# Patient Record
Sex: FEMALE | Race: WHITE | NOT HISPANIC OR LATINO | URBAN - METROPOLITAN AREA
[De-identification: names, ages, dates, MRNs, and addresses within clinical notes are randomized per-mention and may not be internally consistent; named-entity substitution may affect disease eponyms.]

---

## 2017-01-23 ENCOUNTER — APPOINTMENT (RX ONLY)
Dept: URBAN - METROPOLITAN AREA SURGERY 2 | Facility: SURGERY | Age: 67
Setting detail: DERMATOLOGY
End: 2017-01-23

## 2017-01-23 DIAGNOSIS — H61.11 ACQUIRED DEFORMITY OF PINNA: ICD-10-CM

## 2017-01-23 DIAGNOSIS — Z428 OTHER AFTERCARE INVOLVING THE USE OF PLASTIC SURGERY: ICD-10-CM

## 2017-01-23 PROBLEM — H61.119 ACQUIRED DEFORMITY OF PINNA, UNSPECIFIED EAR: Status: ACTIVE | Noted: 2017-01-23

## 2017-01-23 PROCEDURE — ? PATIENT SPECIFIC COUNSELING

## 2017-01-23 PROCEDURE — ? CONSULTATION - STRETCHED EARLOBE

## 2017-01-23 PROCEDURE — ? FILLERS

## 2017-01-23 NOTE — PROCEDURE: PATIENT SPECIFIC COUNSELING
Other (Free Text): Patient happy with results, wants more volume in the cheeks.
Detail Level: Simple
Other (Free Text): Patient advised that Dr. Nieto recommends no surgery, but possible  the area will help what she doesn't like about her earlobes.\\n\\nPatient stated that she will wait until her next visit for this filler.

## 2017-01-23 NOTE — PROCEDURE: FILLERS
Lateral Face Filler Volume In Cc: 0
Consent: Written consent obtained. Risks include but not limited to bruising, beading, irregular texture, ulceration, infection, allergic reaction, scar formation, incomplete augmentation, temporary nature, procedural pain.
Map Statment: See attached map for complete details
Additional Area 1 Location: Nasal bridge
Topical Anesthesia?: no
Expiration Date (Month Year): 1/27/2018
Lot #: [id number]
Anesthesia Type: 1% lidocaine with epinephrine
Detail Level: Detailed
Filler: Juvederm Voluma XC
Price $ (Numeric Only, No Text Please.): 950
Cheeks Filler Volume In Cc: 0.5

## 2017-03-01 ENCOUNTER — APPOINTMENT (RX ONLY)
Dept: URBAN - METROPOLITAN AREA SURGERY 2 | Facility: SURGERY | Age: 67
Setting detail: DERMATOLOGY
End: 2017-03-01

## 2017-03-01 DIAGNOSIS — Z41.1 ENCOUNTER FOR COSMETIC SURGERY: ICD-10-CM

## 2017-03-01 PROCEDURE — ? PATIENT SPECIFIC COUNSELING

## 2017-03-01 PROCEDURE — ? FILLERS

## 2017-03-01 NOTE — PROCEDURE: PATIENT SPECIFIC COUNSELING
Detail Level: Simple
Other (Free Text): Explained to the patient that I need to build up around her cheek bone. \\nDiscussed treating her lips with Volbella after her oral surgery.

## 2017-03-01 NOTE — PROCEDURE: FILLERS
Topical Anesthesia?: yes
Consent: Written consent obtained. Risks include but not limited to bruising, beading, irregular texture, ulceration, infection, allergic reaction, scar formation, incomplete augmentation, temporary nature, procedural pain.
Dorsal Hands Filler Volume In Cc: 0
Use Map Statement For Sites (Optional): No
Cheeks Filler Volume In Cc: 0.5
Detail Level: Detailed
Administered By (Optional): Emilia
Map Statment: See attached map for complete details
Expiration Date (Month Year): 1/18
Filler: Juvederm Voluma XC
Lot #: Residual filler

## 2017-03-02 ENCOUNTER — APPOINTMENT (RX ONLY)
Dept: URBAN - METROPOLITAN AREA OTHER 9 | Facility: OTHER | Age: 67
Setting detail: DERMATOLOGY
End: 2017-03-02

## 2017-03-02 DIAGNOSIS — L65.8 OTHER SPECIFIED NONSCARRING HAIR LOSS: ICD-10-CM

## 2017-03-02 PROCEDURE — ? HAIR TRANSPLANTATION

## 2017-03-02 ASSESSMENT — LOCATION SIMPLE DESCRIPTION DERM
LOCATION SIMPLE: RIGHT EYEBROW
LOCATION SIMPLE: LEFT EYEBROW

## 2017-03-02 ASSESSMENT — LOCATION DETAILED DESCRIPTION DERM
LOCATION DETAILED: RIGHT CENTRAL EYEBROW
LOCATION DETAILED: LEFT CENTRAL EYEBROW

## 2017-03-02 ASSESSMENT — LOCATION ZONE DERM: LOCATION ZONE: FACE

## 2017-03-02 NOTE — PROCEDURE: HAIR TRANSPLANTATION
Epidermal Closure: running horizontal mattress
Donor Site Anesthesia Type: 0.5% lidocaine with 1:400,000 epinephrine
Post-Op: Following the procedure the donor and recipient sites were cleaned and ointment was applied. Postcare instructions were reviewed in detail with the paitent and they were given a copy to take home. All of the patient's questions were answered prior to leaving.
Detail Level: Simple
Transplantation Type: Strip Method
Estimated Blood Loss (Cc): minimal
Hemostasis: None
Follicle [place]- Strip Method: After the donor site was prepped and anesthetized a thin strip of skin, with intact viable follicles, was obtained. Individual follicles were then carefully removed and prepared for transplant.
Donor Location: posterior scalp
Size Of Graft (Optional): Follicular Units
Epidermal Sutures- Donor Site: 4-0 Ethilon
Recipient Site Preparation And Placement: Numerous individual transplantation sites were created within the recipient site. The previously harvested follicles were then placed in these transplantation sites.
Recipient Site Anesthesia Method: local infiltration
Recipient Site Anesthesia Volume In Cc: 15
Deep Sutures- Donor Site: 3-0 Vicryl
Post-Care Instructions: 1. Alcoholic beverages, ibuprofen and aspirin are not to be taken for 48 hours after surgery.  We will provide you with a prescription for pain medications.  2. On limited occasions, minor bleeding may occur from the transplant site or donor area. This can be controlled by applying gentle but firm pressure on the area for not less than 5-10 minutes with a dampened gauze pad which we will provide for you.  PRESS ONLY, DO NOT RUB.  3.  Sleep in an upright position the first night after surgery, either in a recliner or prop up your head with two pillows. This will help prevent swelling of the scalp and forhead. Try not to sleep on your face.  4.  To prevent swelling of the forehead or around the eyes, apply an ice pack (pack of frozen peas will work well) over the eyebrows for 5-10 minutes every hour.  DO NOT APPLY ICE TO THE GRAFTS. If swelling occurs it will appear on the second or third day after surgery. The swelling is part of the normal healing process, and will usually disappear with 48 hours. Begin using ice packs immediately after surgery and continue for the next 72 hours. Do not use hot compresses, this will intensify swelling.  5. Scabbing will form over the transplant sites and solidify the first few hours after the procedure. These scabs will naturally fall off within 2 weeks.  DO NOT PICK OR SCRATCH AT THESE SCABS. Doing so will endanger the healing of the graft and could cause scarring or increase the chance of infection. Don't be alarmed if there are short hairs in the scabs when they fall off. The hair bulb is inside the scalp. It is natrual to loose the hair shaft.  6. Do not wash your head for 24 hours after the surgery. After 24 hours, lightly wet the hair in the shower, spread shampoo in your hands and gently pat the shampoo on, DO NOT RUB. Rinse lightly in the shower and do not let the full strength of the flow hit your head, it could dislodge some of the grafts for the first 5 days. It is recommended to wash your head daily for the first 2 weeks.  7. For the first week be careful of what kind of strenous activity you perform. Weight lifting and intense workouts should not be done for 7 days. Most activitites in the work place should be easily resumed a day or two after the procedure. Please ask us if you have any questions or concerns.  8. BE SURE TO TAKE ALL MEDICATIONS PROVIDED FOR YOU AS DIRECTED.  9. With the FUE procedure there are no sutures to remove and it is normal for small holes where the donor hair was removed to bleed some the first night.  10. DO NOTE USE PEROXIDE on your scalp or other ointments unless instructed by the physician. This could affect the healing or success of the procedure.  11. Avoid direct sunlight on the head (a loose fitting hat can be worn initially while the recipient and donor sites heal).
Width Of Donor Strip In Cm If Applicable(Optional): 0.7
Suture Removal: 7 days
Surgeon: PRATEEK Lombardo
Price (Use Numbers Only, No Special Characters Or $): 1000
Quality Of Donor Hair: adequate
Consent: Written consent obtained, risks reviewed including but not limited to pain, scarring, infection and incomplete improvement of alopecia.  Patient understands the procedure is cosmetic in nature and will require out of pocket payment.
Indication: Eyebrow Hypotrichosis
Surgical Preparation: After consent was obtained, the donor site and recipient site were cleaned and prepped in the usual fashion.  Anesthesia was obtained as outlined above. The appropriate hair within and immediately surrounding the donor site was trimmed to the desired length.

## 2017-03-09 ENCOUNTER — APPOINTMENT (RX ONLY)
Dept: URBAN - METROPOLITAN AREA OTHER 9 | Facility: OTHER | Age: 67
Setting detail: DERMATOLOGY
End: 2017-03-09

## 2017-03-09 DIAGNOSIS — Z48.02 ENCOUNTER FOR REMOVAL OF SUTURES: ICD-10-CM

## 2017-03-09 PROCEDURE — 99024 POSTOP FOLLOW-UP VISIT: CPT

## 2017-03-09 PROCEDURE — ? SUTURE REMOVAL (GLOBAL PERIOD)

## 2017-03-09 ASSESSMENT — LOCATION DETAILED DESCRIPTION DERM: LOCATION DETAILED: RIGHT CENTRAL FRONTAL SCALP

## 2017-03-09 ASSESSMENT — LOCATION ZONE DERM: LOCATION ZONE: SCALP

## 2017-03-09 ASSESSMENT — LOCATION SIMPLE DESCRIPTION DERM: LOCATION SIMPLE: SCALP

## 2017-03-09 NOTE — PROCEDURE: SUTURE REMOVAL (GLOBAL PERIOD)
Detail Level: Detailed
Body Location Override (Optional - Billing Will Still Be Based On Selected Body Map Location If Applicable): Right parietal scalp
Add 70375 Cpt? (Important Note: In 2017 The Use Of 46902 Is Being Tracked By Cms To Determine Future Global Period Reimbursement For Global Periods): yes

## 2017-03-16 ENCOUNTER — APPOINTMENT (RX ONLY)
Dept: URBAN - METROPOLITAN AREA OTHER 9 | Facility: OTHER | Age: 67
Setting detail: DERMATOLOGY
End: 2017-03-16

## 2017-03-16 DIAGNOSIS — Z48.02 ENCOUNTER FOR REMOVAL OF SUTURES: ICD-10-CM

## 2017-03-16 PROCEDURE — ? SUTURE REMOVAL (GLOBAL PERIOD)

## 2017-03-16 PROCEDURE — 99024 POSTOP FOLLOW-UP VISIT: CPT

## 2017-03-16 ASSESSMENT — LOCATION ZONE DERM: LOCATION ZONE: SCALP

## 2017-03-16 ASSESSMENT — LOCATION SIMPLE DESCRIPTION DERM: LOCATION SIMPLE: SCALP

## 2017-03-16 ASSESSMENT — LOCATION DETAILED DESCRIPTION DERM: LOCATION DETAILED: RIGHT CENTRAL FRONTAL SCALP

## 2017-03-16 NOTE — PROCEDURE: SUTURE REMOVAL (GLOBAL PERIOD)
Detail Level: Detailed
Add 55174 Cpt? (Important Note: In 2017 The Use Of 51315 Is Being Tracked By Cms To Determine Future Global Period Reimbursement For Global Periods): yes

## 2017-04-19 ENCOUNTER — APPOINTMENT (RX ONLY)
Dept: URBAN - METROPOLITAN AREA OTHER 9 | Facility: OTHER | Age: 67
Setting detail: DERMATOLOGY
End: 2017-04-19

## 2017-04-19 ENCOUNTER — APPOINTMENT (RX ONLY)
Dept: URBAN - METROPOLITAN AREA SURGERY 2 | Facility: SURGERY | Age: 67
Setting detail: DERMATOLOGY
End: 2017-04-19

## 2017-04-19 DIAGNOSIS — Z41.1 ENCOUNTER FOR COSMETIC SURGERY: ICD-10-CM

## 2017-04-19 DIAGNOSIS — L65.8 OTHER SPECIFIED NONSCARRING HAIR LOSS: ICD-10-CM

## 2017-04-19 DIAGNOSIS — L72.0 EPIDERMAL CYST: ICD-10-CM

## 2017-04-19 PROCEDURE — ? FILLERS

## 2017-04-19 PROCEDURE — ? OTHER

## 2017-04-19 PROCEDURE — 99024 POSTOP FOLLOW-UP VISIT: CPT

## 2017-04-19 PROCEDURE — ? COUNSELING

## 2017-04-19 ASSESSMENT — LOCATION DETAILED DESCRIPTION DERM
LOCATION DETAILED: LEFT MEDIAL EYEBROW
LOCATION DETAILED: RIGHT MEDIAL EYEBROW

## 2017-04-19 ASSESSMENT — LOCATION ZONE DERM: LOCATION ZONE: FACE

## 2017-04-19 ASSESSMENT — LOCATION SIMPLE DESCRIPTION DERM
LOCATION SIMPLE: RIGHT EYEBROW
LOCATION SIMPLE: LEFT EYEBROW

## 2017-04-19 NOTE — PROCEDURE: OTHER
Note Text (......Xxx Chief Complaint.): This diagnosis correlates with the
Other (Free Text): Follow up on hair transplant surgery. Pt is healing well
Detail Level: Zone

## 2017-04-19 NOTE — PROCEDURE: FILLERS
Decollete Filler Volume In Cc: 0
[place] Filler Volume In Cc: 0.2
Use Map Statement For Sites (Optional): No
Additional Area 3 Location: Right upper eyelid
Administered By (Optional): Emilia
Filler: Juvederm Voluma XC
Expiration Date (Month Year): 4/18
Map Statment: See attached map for complete details
Additional Area 1 Location: Right cheek
Additional Area 4 Location: Smile lines
Topical Anesthesia?: yes
Lot #: [id number]
Additional Area 2 Location: Nasal dorsum
Cheeks Filler Volume In Cc: 0.8
Detail Level: Simple
Consent: Written consent obtained. Risks include but not limited to bruising, beading, irregular texture, ulceration, infection, allergic reaction, scar formation, incomplete augmentation, temporary nature, procedural pain.

## 2017-05-09 ENCOUNTER — APPOINTMENT (RX ONLY)
Dept: URBAN - METROPOLITAN AREA SURGERY 2 | Facility: SURGERY | Age: 67
Setting detail: DERMATOLOGY
End: 2017-05-09

## 2017-05-09 DIAGNOSIS — Z41.1 ENCOUNTER FOR COSMETIC SURGERY: ICD-10-CM

## 2017-05-09 PROCEDURE — ? PATIENT SPECIFIC COUNSELING

## 2017-05-09 PROCEDURE — ? CONSULTATION - AGING FACE

## 2017-05-09 NOTE — PROCEDURE: PATIENT SPECIFIC COUNSELING
Other (Free Text): Patients Voluma in left cheek was manually manipulated by Dr. Nieto to create more symmetry. Patient agrees that it looks better, will follow up in 1 week.
Detail Level: Simple

## 2017-05-22 ENCOUNTER — APPOINTMENT (RX ONLY)
Dept: URBAN - METROPOLITAN AREA SURGERY 2 | Facility: SURGERY | Age: 67
Setting detail: DERMATOLOGY
End: 2017-05-22

## 2017-05-22 DIAGNOSIS — Z41.1 ENCOUNTER FOR COSMETIC SURGERY: ICD-10-CM

## 2017-05-22 PROCEDURE — ? PATIENT SPECIFIC COUNSELING

## 2017-06-03 ENCOUNTER — APPOINTMENT (RX ONLY)
Dept: URBAN - METROPOLITAN AREA OTHER 9 | Facility: OTHER | Age: 67
Setting detail: DERMATOLOGY
End: 2017-06-03

## 2017-06-03 DIAGNOSIS — L65.9 NONSCARRING HAIR LOSS, UNSPECIFIED: ICD-10-CM

## 2017-06-03 DIAGNOSIS — L738 OTHER SPECIFIED DISEASES OF HAIR AND HAIR FOLLICLES: ICD-10-CM

## 2017-06-03 DIAGNOSIS — L663 OTHER SPECIFIED DISEASES OF HAIR AND HAIR FOLLICLES: ICD-10-CM

## 2017-06-03 DIAGNOSIS — L73.9 FOLLICULAR DISORDER, UNSPECIFIED: ICD-10-CM

## 2017-06-03 PROBLEM — L02.02 FURUNCLE OF FACE: Status: ACTIVE | Noted: 2017-06-03

## 2017-06-03 PROCEDURE — ? OTHER

## 2017-06-03 PROCEDURE — ? PRESCRIPTION

## 2017-06-03 PROCEDURE — 99213 OFFICE O/P EST LOW 20 MIN: CPT

## 2017-06-03 PROCEDURE — ? COUNSELING

## 2017-06-03 RX ORDER — MINOXIDIL 10 MG/1
TABLET ORAL
Qty: 90 | Refills: 3 | Status: ERX

## 2017-06-03 ASSESSMENT — LOCATION SIMPLE DESCRIPTION DERM
LOCATION SIMPLE: RIGHT SCALP
LOCATION SIMPLE: LEFT EYEBROW
LOCATION SIMPLE: RIGHT EYEBROW

## 2017-06-03 ASSESSMENT — LOCATION ZONE DERM
LOCATION ZONE: FACE
LOCATION ZONE: SCALP

## 2017-06-03 ASSESSMENT — LOCATION DETAILED DESCRIPTION DERM
LOCATION DETAILED: RIGHT LATERAL EYEBROW
LOCATION DETAILED: RIGHT CENTRAL EYEBROW
LOCATION DETAILED: LEFT LATERAL EYEBROW
LOCATION DETAILED: RIGHT MEDIAL FRONTAL SCALP
LOCATION DETAILED: LEFT MEDIAL EYEBROW
LOCATION DETAILED: LEFT CENTRAL EYEBROW

## 2017-06-03 NOTE — HPI: BUMPS
How Severe Are Your Bumps?: mild
Have Your Bumps Been Treated?: not been treated
Is This A New Presentation, Or A Follow-Up?: Bumps
Additional History: Patient states that bumps are coming and going from restoration site. Patient was advised by Dr. Nieto to RTC for lancing. Patient is also requesting prescription refill for minoxidil 10mg 1 tablet QD. Previous PCP, Dr. Ruiz, was providing prescription but passed.

## 2017-06-03 NOTE — PROCEDURE: OTHER
Note Text (......Xxx Chief Complaint.): This diagnosis correlates with the
Other (Free Text): Treated with 16G needle today
Detail Level: Zone

## 2017-06-19 ENCOUNTER — APPOINTMENT (RX ONLY)
Dept: URBAN - METROPOLITAN AREA SURGERY 2 | Facility: SURGERY | Age: 67
Setting detail: DERMATOLOGY
End: 2017-06-19

## 2017-06-19 DIAGNOSIS — Z41.1 ENCOUNTER FOR COSMETIC SURGERY: ICD-10-CM

## 2017-06-19 PROCEDURE — ? FILLERS (CC)

## 2017-06-19 PROCEDURE — ? COSMETIC CONSULTATION - ULTHERAPY

## 2017-06-19 ASSESSMENT — LOCATION DETAILED DESCRIPTION DERM
LOCATION DETAILED: RIGHT LATERAL BUCCAL CHEEK
LOCATION DETAILED: RIGHT INFERIOR CENTRAL MALAR CHEEK
LOCATION DETAILED: LEFT INFERIOR LATERAL MALAR CHEEK
LOCATION DETAILED: SUBMENTAL CHIN

## 2017-06-19 ASSESSMENT — LOCATION SIMPLE DESCRIPTION DERM
LOCATION SIMPLE: SUBMENTAL CHIN
LOCATION SIMPLE: LEFT CHEEK
LOCATION SIMPLE: RIGHT CHEEK

## 2017-06-19 ASSESSMENT — LOCATION ZONE DERM: LOCATION ZONE: FACE

## 2017-06-19 NOTE — PROCEDURE: FILLERS (CC)
Anesthesia Volume In Cc: 0
Additional Area 2 Volume In Cc: 0.5
Expiration Date (Month Year): September 2018
Additional Area 2 Location: left submalar region
Postcare: The patient was instructed to apply ice for 30 minutes and avoid exertion for 24 hours.
Quantity Per Injection Site (Cc): 0.5 cc
Topical Anesthesia: topical 4% lidocaine
Consent: Informed consent was obtained with the patient verbalizing full understanding. The risks, benefits, expectations and alternatives of the procedure were explained in detail to the patient, including, but not limited to, bruising, beading, irregular texture, ulceration, infection, allergic reaction, scar formation, incomplete augmentation, temporary nature, procedural pain.
Lot #: [id number]
Detail Level: Generalized
Price $ (Use Numbers Only, No Text Please.): 867
Filler: Restylane
Additional Area 1 Location: right submalar region

## 2017-06-19 NOTE — PROCEDURE: COSMETIC CONSULTATION - ULTHERAPY
Detail Level: Detailed
Procedure Quote $ (Will Render In Note. Use Numbers Only, No Text Please.): 8731
Send Procedure Quote As Charge: No

## 2017-08-31 ENCOUNTER — APPOINTMENT (RX ONLY)
Dept: URBAN - METROPOLITAN AREA OTHER 9 | Facility: OTHER | Age: 67
Setting detail: DERMATOLOGY
End: 2017-08-31

## 2017-08-31 DIAGNOSIS — L82.1 OTHER SEBORRHEIC KERATOSIS: ICD-10-CM

## 2017-08-31 DIAGNOSIS — L72.8 OTHER FOLLICULAR CYSTS OF THE SKIN AND SUBCUTANEOUS TISSUE: ICD-10-CM

## 2017-08-31 PROCEDURE — ? PATIENT SPECIFIC COUNSELING

## 2017-08-31 PROCEDURE — ? INTRALESIONAL KENALOG

## 2017-08-31 PROCEDURE — 11900 INJECT SKIN LESIONS </W 7: CPT

## 2017-08-31 PROCEDURE — ? COUNSELING

## 2017-08-31 PROCEDURE — ? COSMETIC QUOTE

## 2017-08-31 ASSESSMENT — LOCATION SIMPLE DESCRIPTION DERM
LOCATION SIMPLE: LEFT EYEBROW
LOCATION SIMPLE: UPPER BACK

## 2017-08-31 ASSESSMENT — LOCATION DETAILED DESCRIPTION DERM
LOCATION DETAILED: INFERIOR THORACIC SPINE
LOCATION DETAILED: LEFT MEDIAL EYEBROW

## 2017-08-31 ASSESSMENT — LOCATION ZONE DERM
LOCATION ZONE: TRUNK
LOCATION ZONE: FACE

## 2017-08-31 NOTE — PROCEDURE: INTRALESIONAL KENALOG
Medical Necessity Clause: This procedure was medically necessary because the lesions that were treated were:
Detail Level: Simple
Administered By (Optional): 
Concentration Of Kenalog Solution Injected (Mg/Ml): 1.0
X Size Of Lesion In Cm (Optional): 0
Treatment Number (Optional): 1
Consent: The risks of atrophy were reviewed with the patient.
Total Volume (Ccs): .1
Include Z78.9 (Other Specified Conditions Influencing Health Status) As An Associated Diagnosis?: No
Kenalog Preparation: Kenalog
Lot # For Kenalog (Optional): [id number]
Expiration Date For Kenalog (Optional): Aug 2018

## 2017-08-31 NOTE — PROCEDURE: COSMETIC QUOTE
Notice: We have created a more complete Cosmetic Quote plan.  The procedure name is also Cosmetic Quote.  Please review the new plan and hide the Cosmetic Quote plan you do not want to use.
Discount Percentage: 0
Botox Price Per Unit: 15
Detail Level: Simple
Restylane Price Per Syringe: 300
Misc Procedure Description: Removal of Seborrheic Keratosis

## 2017-12-29 ENCOUNTER — APPOINTMENT (RX ONLY)
Dept: URBAN - METROPOLITAN AREA CLINIC 12 | Facility: CLINIC | Age: 67
Setting detail: DERMATOLOGY
End: 2017-12-29

## 2017-12-29 DIAGNOSIS — L72.0 EPIDERMAL CYST: ICD-10-CM

## 2017-12-29 DIAGNOSIS — D48.5 NEOPLASM OF UNCERTAIN BEHAVIOR OF SKIN: ICD-10-CM

## 2017-12-29 PROCEDURE — 11442 EXC FACE-MM B9+MARG 1.1-2 CM: CPT

## 2017-12-29 PROCEDURE — ? COUNSELING - INCLUSION CYST

## 2017-12-29 PROCEDURE — 11100: CPT | Mod: 59

## 2017-12-29 PROCEDURE — ? BIOPSY BY SHAVE METHOD

## 2017-12-29 PROCEDURE — ? EXCISION, CUTANEOUS (BENIGN)

## 2017-12-29 PROCEDURE — 13131 CMPLX RPR F/C/C/M/N/AX/G/H/F: CPT

## 2017-12-29 PROCEDURE — 11101: CPT

## 2017-12-29 ASSESSMENT — LOCATION DETAILED DESCRIPTION DERM
LOCATION DETAILED: RIGHT LATERAL MANDIBULAR CHEEK
LOCATION DETAILED: LEFT MEDIAL SUPERIOR CHEST
LOCATION DETAILED: RIGHT INFERIOR PREAURICULAR CHEEK
LOCATION DETAILED: RIGHT MEDIAL BREAST 2-3:00 REGION
LOCATION DETAILED: LOWER STERNUM
LOCATION DETAILED: LEFT MEDIAL BREAST 10-11:00 REGION

## 2017-12-29 ASSESSMENT — LOCATION SIMPLE DESCRIPTION DERM
LOCATION SIMPLE: LEFT BREAST
LOCATION SIMPLE: RIGHT CHEEK
LOCATION SIMPLE: CHEST
LOCATION SIMPLE: RIGHT BREAST

## 2017-12-29 ASSESSMENT — LOCATION ZONE DERM
LOCATION ZONE: TRUNK
LOCATION ZONE: TRUNK
LOCATION ZONE: FACE

## 2017-12-29 NOTE — PROCEDURE: BIOPSY BY SHAVE METHOD
Surgeon: Dr. Nieto
Anesthesia Type: 1% lidocaine with epinephrine
Anesthesia Volume In Cc: 2
Estimated Blood Loss (Cc): scant
Consent: Written consent was obtained and risks, benefits, expectations and alternatives were reviewed including, but not limited to, scarring, infection, bleeding, scabbing, incomplete removal, nerve damage and allergy to anesthesia.
Notification Instructions: Patient will be notified of biopsy results. However, the patient was instructed to call the office if not contacted within 2 weeks.
Detail Level: Detailed
Bill For Surgical Tray: no
Biopsy Method: Dermablade
Biopsy Type: H and E
Hemostasis: Aluminum Chloride
Size Of Lesion In Cm (Optional): 0.2

## 2017-12-29 NOTE — PROCEDURE: EXCISION, CUTANEOUS (BENIGN)
Cartilage Graft: The defect edges were debeveled with a #15 scalpel blade.  Given the location of the defect, shape of the defect, the fact the defect involved a full thickness cartilage defect a cartilage graft was deemed most appropriate.  An appropriate donor site was identified, cleansed, and anesthetized. The cartilage graft was then harvested and transferred to the recipient site, oriented appropriately and then sutured into place.  The secondary defect was then repaired using a primary closure.
Detail Level: Detailed
Ebl Minimal?: yes
Consent was obtained from the patient. The risks and benefits to therapy were discussed in detail. Specifically, the risks of infection, scarring, bleeding, prolonged wound healing, incomplete removal, allergy to anesthesia, nerve injury and recurrence were addressed. Prior to the procedure, the treatment site was clearly identified and confirmed by the patient. All components of Universal Protocol/PAUSE Rule completed.
Split Thickness Skin Graft: The defect edges were debeveled with a #15 scalpel blade.  Given the location of the defect, shape of the defect and the proximity to free margins, a split thickness skin graft was deemed most appropriate.  Using a sterile surgical marker, the primary defect shape was transferred to the donor site. The split thickness graft was then harvested.  The skin graft was then placed in the primary defect and oriented appropriately.
Medical Necessity Clause: This procedure was medically necessary because the lesion that was treated was:
O-T Advancement Flap: The defect edges were debeveled with a #15 scalpel blade.  Given the location of the defect, shape of the defect and the proximity to free margins an O-T advancement flap was deemed most appropriate.  Using a sterile surgical marker, an appropriate advancement flap was drawn incorporating the defect and placing the expected incisions within the relaxed skin tension lines where possible.    The area thus outlined was incised deep to adipose tissue with a #15 scalpel blade.  The skin margins were undermined to an appropriate distance in all directions utilizing iris scissors.
Cheek Advancement Flap: The defect edges were debeveled with a #15 scalpel blade.  Given the location of the defect, shape of the defect and the proximity to free margins a cheek advancement flap was deemed most appropriate.  Using a sterile surgical marker, an appropriate advancement flap was drawn incorporating the defect and placing the expected incisions within the relaxed skin tension lines where possible. The incision was carried down the alar-facial groove and nasolabial fold and up the nasal sidewall as necessary to release the tension and allow advancement. The area thus outlined was incised deep to adipose tissue with a #15 scalpel blade.  The skin margins were undermined to an appropriate distance in all directions utilizing iris scissors.
M-Plasty: The lesion was extirpated to the level of the fat with a #15 scalpel blade.  Given the location of the defect, shape of the defect and the proximity to free margins, an M-plasty was deemed most appropriate for repair to reduce the amount of healthy tissue removed.  The appropriate transposition arms of the M-plasty were drawn placing the expected incisions within the relaxed skin tension lines where possible. The area thus outlined was incised deep to adipose tissue with a #15 scalpel blade. The skin margins were undermined to an appropriate distance in all directions utilizing iris scissors. The limbs of the M were then closed.
Linear Repair Length, In Cm: 1.5
Suture Removal: 7 days
Double-Opposing Z-Plasty: The defect edges were debeveled with a #15 scalpel blade.  Given the location of the defect, shape of the defect and the proximity to free margins, a double-opposing Z-plasty (double transposition flap) was deemed most appropriate.  Using a sterile surgical marker, the appropriate transposition flaps were drawn incorporating the defect and placing the expected incisions within the relaxed skin tension lines where possible.  The area thus outlined was incised deep to adipose tissue with a #15 scalpel blade.  The skin margins were undermined to an appropriate distance in all directions utilizing iris scissors.  Hemostasis was achieved with electrocautery.  The flaps were then transposed into place, one clockwise and the other counterclockwise, and anchored with interrupted buried subcutaneous sutures.
Melolabial Flap: The defect edges were debeveled with a #15 scalpel blade.  Given the location of the defect and the proximity to free margins a melolabial flap was deemed most appropriate.  Using a sterile surgical marker, an appropriate melolabial flap was drawn incorporating the defect.    The area thus outlined was incised deep to adipose tissue with a #15 scalpel blade.  The skin margins were undermined to an appropriate distance in all directions utilizing iris scissors.
Transposition Flap: The defect edges were debeveled with a #15 scalpel blade.  Given the location of the defect and the proximity to free margins a transposition flap was deemed most appropriate.  Using a sterile surgical marker, an appropriate transposition flap was drawn incorporating the defect.    The area thus outlined was incised deep to adipose tissue with a #15 scalpel blade.  The skin margins were undermined to an appropriate distance in all directions utilizing iris scissors.
Helical Advancement Flap: The defect edges were debeveled with a #15 blade scalpel.  Given the location of the defect and the proximity to free margins (helical rim) a double helical rim advancement flap was deemed most appropriate.  Using a sterile surgical marker, the appropriate advancement flaps were drawn incorporating the defect and placing the expected incisions between the helical rim and antihelix where possible.  The area thus outlined was incised through and through with a #15 scalpel blade.  With a skin hook and iris scissors, the flaps were gently and sharply undermined and freed up.
Complex Repair: Complex closure of the defect was planned to allow for a tension free repair.
Island Pedicle Flap- Requiring A Named Axial Vessel: The defect edges were debeveled with a #15 scalpel blade.  Given the location of the defect, shape of the defect and the proximity to free margins an island pedicle advancement flap was deemed most appropriate.  Using a sterile surgical marker, an appropriate advancement flap was drawn, based on the axial vessel mentioned above, incorporating the defect, outlining the appropriate donor tissue and placing the expected incisions within the relaxed skin tension lines where possible.    The area thus outlined was incised deep to adipose tissue with a #15 scalpel blade.  The skin margins were undermined to an appropriate distance in all directions around the primary defect and laterally outward around the island pedicle utilizing iris scissors.  There was minimal undermining beneath the pedicle flap.
Path Notes (To The Pathologist): Please check margins.
Dorsal Nasal Flap: The defect edges were debeveled with a #15 scalpel blade.  Given the location of the defect and the proximity to free margins a dorsal nasal flap was deemed most appropriate.  Using a sterile surgical marker, an appropriate dorsal nasal flap was drawn around the defect.    The area thus outlined was incised deep to adipose tissue with a #15 scalpel blade.  The skin margins were undermined to an appropriate distance in all directions utilizing iris scissors.
Dermal Sutures: 0
Z-Plasty: The lesion was extirpated to the level of the fat with a #15 scalpel blade.  Given the location of the defect, shape of the defect and the proximity to free margins, a Z-plasty was deemed most appropriate for repair.  Using a sterile surgical marker, the appropriate transposition arms of the Z-plasty were drawn incorporating the defect and placing the expected incisions within the relaxed skin tension lines where possible.  The area thus outlined was incised deep to adipose tissue with a #15 scalpel blade.  The skin margins were undermined to an appropriate distance in all directions utilizing iris scissors.  The opposing transposition arms were then transposed into place in opposite direction and inset.
Advancement Flap (Single): The defect edges were debeveled with a #15 scalpel blade.  Given the location of the defect and the proximity to free margins, a single advancement flap was deemed most appropriate.  Using a sterile surgical marker, an appropriate advancement flap was drawn incorporating the defect and placing the expected incisions within the relaxed skin tension lines where possible.  The area thus outlined was incised deep to adipose tissue with a #15 scalpel blade.  The skin margins were undermined to an appropriate distance in all directions utilizing iris scissors.
Repair Type: Complex
Rhombic Flap: The defect edges were debeveled with a #15 scalpel blade.  Given the location of the defect and the proximity to free margins, a rhombic flap was deemed most appropriate.  Using a sterile surgical marker, an appropriate rhombic flap was drawn incorporating the defect.  The area thus outlined was incised deep to adipose tissue with a #15 scalpel blade.  The skin margins were undermined to an appropriate distance in all directions utilizing iris scissors.
Intermediate Layered Repair: Intermediate repair was planned with layered closure after freeing the skin edges for advancement.
Staged Pedicle Interpolation Flap: A staged pedicle interpolation flap was selected to achieve optimal aesthetic result, restore normal anatomic appearance and avoid distortion of normal anatomy and expedite and facilitate wound healing. The patient was prepped and draped in the usual manner. A template of the defect was utilized to design the pedicle flap. The donor area was anesthetized. The margins were incised to the level of the superficial fascia with a #15 blade. Undermining of the flap was performed in the fascial plane. Hemostasis was achieved. The pedicle flap was then placed into the defect and the flap was trimmed to match the shape of the defect. The flap donor site wound edges were the re-approximated utilizing deep buried sutures, and simple interrupted sutures. Division of the pedicle flap will be performed in 3 weeks.
Approach And Technique - Excisional Biopsy: An excisional biopsy was designed encompasing the lesion. After ensuring adequate anesthesia, the lesion was excised with a #15 scalpel blade. The specimen was sent for pathologic evaluation.
Bilobed Flap: The defect edges were debeveled with a #15 scalpel blade.  Given the location of the defect and the proximity to free margins, a bilobe flap was deemed most appropriate.  Using a sterile surgical marker, an appropriate bilobe flap drawn around the defect.  The area thus outlined was incised deep to adipose tissue with a #15 scalpel blade.  The skin margins were undermined to an appropriate distance in all directions utilizing iris scissors.
Cheek Interpolation Flap: A decision was made to reconstruct the defect utilizing an interpolation axial flap and a staged reconstruction.  A telfa template was made of the defect.  This telfa template was then used to outline the Cheek Interpolation flap.  The donor area for the pedicle flap was then injected with anesthesia.  The flap was excised through the skin and subcutaneous tissue down to the layer of the underlying musculature.  The interpolation flap was carefully excised within this deep plane to maintain its blood supply.  The edges of the donor site were undermined.   The donor site was closed in a primary fashion.  The pedicle was then rotated into position and sutured.  Once the tube was sutured into place, adequate blood supply was confirmed with blanching and refill.  The pedicle was then wrapped with xeroform gauze and dressed appropriately with a telfa and gauze bandage to ensure continued blood supply and protect the attached pedicle.
X Size Of Lesion In Cm (Optional): 1
Antia-Buch Procedure: An Antia-Buch procedure with wedge cartilage excision was performed for reconstruction of the ear defect. Incisions were made in the helical sulcus with triangular composite excisions of skin and cartilage, leaving the postauricular skin intact as a vascular base. A triangular full thickness wedge excision was completed and the backcut flaps were rotated around to close the defect. The helical rim was approximated first to precisely align the curvature of the helix. Closure was performed to include cartilage and the skin flaps for good cartilage contact and complete coverage over the approximated cartilage.
Rotation Flap: The defect edges were debeveled with a #15 scalpel blade.  Given the location of the defect, shape of the defect and the proximity to free margins, a rotation flap was deemed most appropriate.  Using a sterile surgical marker, an appropriate rotation flap was drawn incorporating the defect and placing the expected incisions within the relaxed skin tension lines where possible.  The area thus outlined was incised deep to adipose tissue with a #15 scalpel blade.  The skin margins were undermined to an appropriate distance in all directions utilizing iris scissors.
Advancement Flap (Double): The defect edges were debeveled with a #15 scalpel blade.  Given the location of the defect and the proximity to free margins, a double advancement flap was deemed most appropriate.  Using a sterile surgical marker, the appropriate advancement flaps were drawn incorporating the defect and placing the expected incisions within the relaxed skin tension lines where possible.  The area thus outlined was incised deep to adipose tissue with a #15 scalpel blade.  The skin margins were undermined to an appropriate distance in all directions utilizing iris scissors.
Island Pedicle Flap: The defect edges were debeveled with a #15 scalpel blade.  Given the location of the defect, shape of the defect and the proximity to free margins an island pedicle advancement flap was deemed most appropriate.  Using a sterile surgical marker, an appropriate advancement flap was drawn incorporating the defect, outlining the appropriate donor tissue and placing the expected incisions within the relaxed skin tension lines where possible.    The area thus outlined was incised deep to adipose tissue with a #15 scalpel blade.  The skin margins were undermined to an appropriate distance in all directions around the primary defect and laterally outward around the island pedicle utilizing iris scissors.  There was minimal undermining beneath the pedicle flap.
Dermal Autograft: The defect edges were debeveled with a #15 scalpel blade.  Given the location of the defect, shape of the defect and the proximity to free margins, a dermal autograft was deemed most appropriate.  Using a sterile surgical marker, the primary defect shape was transferred to the donor site. The area thus outlined was incised deep to adipose tissue with a #15 scalpel blade.  The harvested graft was then trimmed of adipose and epidermal tissue until only dermis was left.  The skin graft was then placed in the primary defect and oriented appropriately.
Ear Star Wedge Flap: An ear wedge excision with lateral wedge extensions was identified as the best reconstuctive method of the defect. These were designed to incorporate the defect. The full thickness wedge was excised as were two crescentic extensions to allow for a small diameter reduction to the ear and ease of closing and advancing the flaps. The flaps were inset and closure accomplished.
Modified Advancement Flap: The defect edges were debeveled with a #15 scalpel blade.  Given the location of the defect, shape of the defect and the proximity to free margins a modified advancement flap was deemed most appropriate.  Using a sterile surgical marker, an appropriate advancement flap was drawn incorporating the defect and placing the expected incisions within the relaxed skin tension lines where possible.    The area thus outlined was incised deep to adipose tissue with a #15 scalpel blade.  The skin margins were undermined to an appropriate distance in all directions utilizing iris scissors.
Double M-Plasty: The lesion was extirpated to the level of the fat with a #15 scalpel blade.  Given the location of the defect, shape of the defect and the proximity to free margins, a double M-plasty was deemed most appropriate for repair to reduce the amount of healthy tissue removed.  The appropriate transposition arms of the M-plastys were drawn placing the expected incisions within the relaxed skin tension lines where possible. The area thus outlined was incised deep to adipose tissue with a #15 scalpel blade. The skin margins were undermined to an appropriate distance in all directions utilizing iris scissors. The limbs of the M's were then closed.
Nasolabial Flap: The defect edges were debeveled with a #15 scalpel blade.  Given the location of the defect and the proximity to free margins, a nasolabial flap was deemed most appropriate.  Using a sterile surgical marker, an appropriate nasolabial flap was drawn incorporating the defect.  The area thus outlined was incised deep to adipose tissue with a #15 scalpel blade.  The skin margins were undermined to an appropriate distance in all directions utilizing iris scissors.
Size Of Margin In Cm: 0.5
Bill For Surgical Tray: no
Xenograft: The defect edges were debeveled with a #15 scalpel blade.  Given the location of the defect, shape of the defect and the proximity to free margins, a xenograft was deemed most appropriate.  The graft was then trimmed to fit the size of the defect.  The graft was then placed in the primary defect and oriented appropriately.
Epidermal Sutures: 5-0
Notification Instructions: Patient will be notified of results. However, the patient was instructed to call the office if not contacted within 2 weeks.
Cheek-To-Nose Interpolation Flap: A decision was made to reconstruct the defect utilizing an interpolation axial flap and a staged reconstruction.  A telfa template was made of the defect.  This telfa template was then used to outline the Cheek-To-Nose Interpolation flap.  The donor area for the pedicle flap was then injected with anesthesia.  The flap was excised through the skin and subcutaneous tissue down to the layer of the underlying musculature.  The interpolation flap was carefully excised within this deep plane to maintain its blood supply.  The edges of the donor site were undermined.   The donor site was closed in a primary fashion.  The pedicle was then rotated into position and sutured.  Once the tube was sutured into place, adequate blood supply was confirmed with blanching and refill.  The pedicle was then wrapped with xeroform gauze and dressed appropriately with a telfa and gauze bandage to ensure continued blood supply and protect the attached pedicle.
S Plasty: Given the location and shape of the defect, and the orientation of relaxed skin tension lines, an S-plasty was deemed most appropriate for repair.  Using a sterile surgical marker, the appropriate outline of the S-plasty was drawn, incorporating the defect and placing the expected incisions within the relaxed skin tension lines where possible.  The area thus outlined was incised deep to adipose tissue with a #15 scalpel blade.  The skin margins were undermined to an appropriate distance in all directions utilizing iris scissors. The skin flaps were advanced over the defect.  The opposing margins were then approximated with interrupted buried subcutaneous sutures.
Epidermal Autograft: The defect edges were debeveled with a #15 scalpel blade.  Given the location of the defect, shape of the defect and the proximity to free margins an epidermal autograft was deemed most appropriate.  Using a sterile surgical marker, the primary defect shape was transferred to the donor site. The epidermal graft was then harvested.  The skin graft was then placed in the primary defect and oriented appropriately.
V-Y Plasty: The defect edges were debeveled with a #15 scalpel blade.  Given the location of the defect, shape of the defect and the proximity to free margins, a V-Y advancement flap was deemed most appropriate.  Using a sterile surgical marker, an appropriate advancement flap was drawn incorporating the defect and placing the expected incisions within the relaxed skin tension lines where possible.  The area thus outlined was incised deep to adipose tissue with a #15 scalpel blade.  The skin margins were undermined to an appropriate distance in all directions utilizing iris scissors.
Mastoid Interpolation Flap: A decision was made to reconstruct the defect utilizing an interpolation axial flap and a staged reconstruction.  A telfa template was made of the defect.  This telfa template was then used to outline the mastoid interpolation flap.  The donor area for the pedicle flap was then injected with anesthesia.  The flap was excised through the skin and subcutaneous tissue down to the layer of the underlying musculature.  The pedicle flap was carefully excised within this deep plane to maintain its blood supply.  The edges of the donor site were undermined.   The donor site was closed in a primary fashion.  The pedicle was then rotated into position and sutured.  Once the tube was sutured into place, adequate blood supply was confirmed with blanching and refill.  The pedicle was then wrapped with xeroform gauze and dressed appropriately with a telfa and gauze bandage to ensure continued blood supply and protect the attached pedicle.
Melolabial Interpolation Flap: A decision was made to reconstruct the defect utilizing an interpolation axial flap and a staged reconstruction.  A telfa template was made of the defect.  This telfa template was then used to outline the melolabial interpolation flap.  The donor area for the pedicle flap was then injected with anesthesia.  The flap was excised through the skin and subcutaneous tissue down to the layer of the underlying musculature.  The pedicle flap was carefully excised within this deep plane to maintain its blood supply.  The edges of the donor site were undermined.   The donor site was closed in a primary fashion.  The pedicle was then rotated into position and sutured.  Once the tube was sutured into place, adequate blood supply was confirmed with blanching and refill.  The pedicle was then wrapped with xeroform gauze and dressed appropriately with a telfa and gauze bandage to ensure continued blood supply and protect the attached pedicle.
Cultured Epidermal Autograft: The defect edges were debeveled with a #15 scalpel blade.  Given the location of the defect, shape of the defect and the proximity to free margins, a tissue cultured epidermal autograft was deemed most appropriate.  The graft was then trimmed to fit the size of the defect.  The graft was then placed in the primary defect and oriented appropriately.
Pedicled Retroauricular Flap: A decision was made to reconstruct the defect utilizing a pedicled retroauricular flap reconstruction.  The pedicle flap was marked, injected with local anesthesia, and carefully excised within the deep subcutaneous plane to maintain its blood supply.  The edges of the donor site were undermined.   The donor site was closed in a primary fashion.  The pedicle was then rotated into position and sutured.  Once the flap was sutured into place, adequate blood supply was confirmed with blanching and refill.  The pedicle was then dressed appropriately with antibiotic ointment and gauze bandage to ensure continued blood supply.
Composite Graft: The defect edges were debeveled with a #15 scalpel blade.  Given the location of the defect, shape of the defect, the proximity to free margins and the fact the defect was full thickness a composite graft was deemed most appropriate.  The defect was outline and then transferred to the donor site.  A full thickness graft was then excised from the donor site. The graft was then placed in the primary defect, oriented appropriately and then sutured into place.  The secondary defect was then repaired using a primary closure.
Epidermal Sutures: Prolene
Double-Opposing Semicircular Flap: The defect was measured and tangents drawn to establish landmarks for the double-opposing semicircular flaps. After measuring the radius and flap lengths, the flaps were marked, the markings incised and cautery used to undermine and raise the flaps. They were rotated into position to reconstruct the defect and the donor sites were closed linearly.
Approach And Technique - Excision: An excision was designed encompasing the lesion with the margin as stated. After ensuring adequate anesthesia, the design was incised with a #15 scalpel blade. The specimen was sent for pathologic evaluation.
Paramedian Forehead Flap: A decision was made to reconstruct the defect utilizing an interpolation axial flap and a staged reconstruction.  A telfa template was made of the defect.  This telfa template was then used to outline the paramedian forehead pedicle flap.  The donor area for the pedicle flap was then injected with anesthesia.  The flap was excised through the skin and subcutaneous tissue down to the layer of the underlying musculature.  The pedicle flap was carefully excised within this deep plane to maintain its blood supply.  The edges of the donor site were undermined.   The donor site was closed in a primary fashion.  The pedicle was then rotated into position and sutured.  Once the tube was sutured into place, adequate blood supply was confirmed with blanching and refill.  The pedicle was then wrapped with xeroform gauze and dressed appropriately with a telfa and gauze bandage to ensure continued blood supply and protect the attached pedicle.
Full Thickness Skin Graft: The defect edges were debeveled with a #15 scalpel blade.  Given the location of the defect, shape of the defect and the proximity to free margins, a full thickness skin graft was deemed most appropriate.  Using a sterile surgical marker, the primary defect shape was transferred to the donor site. The area thus outlined was incised deep to adipose tissue with a #15 scalpel blade.  The harvested graft was then trimmed of adipose tissue until only dermis and epidermis was left.  The skin margins of the secondary defect were undermined to an appropriate distance in all directions utilizing iris scissors.  The secondary defect was closed with interrupted buried subcutaneous sutures.  The skin edges were then re-apposed with running  sutures.  The skin graft was then placed in the primary defect and oriented appropriately.
Burow's triangles were removed to repair standing cone deformities.  These defects were repaired with the same technique and sutured.
A-T Advancement Flap: The defect edges were debeveled with a #15 scalpel blade.  Given the location of the defect, shape of the defect and the proximity to free margins an A-T advancement flap was deemed most appropriate.  Using a sterile surgical marker, an appropriate advancement flap was drawn incorporating the defect and placing the expected incisions within the relaxed skin tension lines where possible.    The area thus outlined was incised deep to adipose tissue with a #15 scalpel blade.  The skin margins were undermined to an appropriate distance in all directions utilizing iris scissors.
W-Plasty: The lesion was extirpated to the level of the fat with a #15 scalpel blade.  Given the location of the defect, shape of the defect and the proximity to free margins, a W-plasty was deemed most appropriate for repair.  Using a sterile surgical marker, the appropriate transposition arms of the W-plasty were drawn incorporating the defect and placing the expected incisions within the relaxed skin tension lines where possible.  The area thus outlined was incised deep to adipose tissue with a #15 scalpel blade.  The skin margins were undermined to an appropriate distance in all directions utilizing iris scissors.  The opposing transposition arms were then transposed into place in opposite direction and inset.
Excision Method: Excision
Simple repair was planned with closure after freeing the skin edges for advancement.
Surgeon: Dr. Nieto

## 2018-01-05 ENCOUNTER — APPOINTMENT (RX ONLY)
Dept: URBAN - METROPOLITAN AREA CLINIC 12 | Facility: CLINIC | Age: 68
Setting detail: DERMATOLOGY
End: 2018-01-05

## 2018-01-05 DIAGNOSIS — Z48.89 ENCOUNTER FOR OTHER SPECIFIED SURGICAL AFTERCARE: ICD-10-CM

## 2018-01-05 PROCEDURE — ? COUNSELING - POST-OP CHECK

## 2018-01-05 PROCEDURE — ? PATIENT SPECIFIC COUNSELING

## 2018-01-05 PROCEDURE — 99024 POSTOP FOLLOW-UP VISIT: CPT

## 2018-01-05 PROCEDURE — ? POST-OP CHECK

## 2018-01-05 ASSESSMENT — LOCATION SIMPLE DESCRIPTION DERM: LOCATION SIMPLE: NECK

## 2018-01-05 ASSESSMENT — LOCATION DETAILED DESCRIPTION DERM: LOCATION DETAILED: RIGHT CENTRAL LATERAL NECK

## 2018-01-05 ASSESSMENT — LOCATION ZONE DERM: LOCATION ZONE: NECK

## 2018-01-05 NOTE — PROCEDURE: PATIENT SPECIFIC COUNSELING
Other (Free Text): Informed patient that she is healing well.\\nDiscussed with patient that she can tape her neck for a week.\\Joseph discussed with patient to use arnica cream to help with bruising.
Detail Level: Detailed

## 2018-01-05 NOTE — PROCEDURE: POST-OP CHECK
Detail Level: Simple
Add Postop Global No-Charge Code (34976)?: yes
Wound Evaluated By: Dr. Nilesh Nieto

## 2018-02-20 ENCOUNTER — APPOINTMENT (RX ONLY)
Dept: URBAN - METROPOLITAN AREA CLINIC 12 | Facility: CLINIC | Age: 68
Setting detail: DERMATOLOGY
End: 2018-02-20

## 2018-02-20 DIAGNOSIS — Z48.89 ENCOUNTER FOR OTHER SPECIFIED SURGICAL AFTERCARE: ICD-10-CM

## 2018-02-20 DIAGNOSIS — Z41.9 ENCOUNTER FOR PROCEDURE FOR PURPOSES OTHER THAN REMEDYING HEALTH STATE, UNSPECIFIED: ICD-10-CM

## 2018-02-20 PROCEDURE — ? PATIENT SPECIFIC COUNSELING

## 2018-02-20 PROCEDURE — ? FILLERS

## 2018-02-20 PROCEDURE — 99024 POSTOP FOLLOW-UP VISIT: CPT

## 2018-02-20 PROCEDURE — ? COUNSELING - POST-OP CHECK

## 2018-02-20 PROCEDURE — ? POST-OP CHECK

## 2018-02-20 ASSESSMENT — LOCATION DETAILED DESCRIPTION DERM: LOCATION DETAILED: RIGHT CENTRAL LATERAL NECK

## 2018-02-20 ASSESSMENT — LOCATION SIMPLE DESCRIPTION DERM: LOCATION SIMPLE: NECK

## 2018-02-20 ASSESSMENT — LOCATION ZONE DERM: LOCATION ZONE: NECK

## 2018-02-20 NOTE — PROCEDURE: POST-OP CHECK
Wound Evaluated By: Dr. Nilesh Nieto
Add Postop Global No-Charge Code (07729)?: yes
Detail Level: Simple

## 2018-02-20 NOTE — PROCEDURE: FILLERS
Brows Filler Volume In Cc: 0
Filler: Belotero
Lot #: [id number]
Additional Area 4 Location: scar
Tear Troughs Filler Volume In Cc: 0.5
Topical Anesthetic #2: tetracaine
Topical Anesthetic #1: lidocaine
Additional Area 5 Location: oral commissures
Show Price In Note?: yes
Additional Area 2 Location: forehead depressions
Map Statment: See attached map for complete details
Additional Area 3 Location: perioral lines
Administered By (Optional): Theresa Vasquez
Topical Anesthetic Base: ointment
Additional Area 1 Location: glabellar/nasal root lines
Price $ (Numeric Only, No Text Please.): 859
Additional Area 1 Location: forehead depression
Topical % #1: 23
Detail Level: Simple
Expiration Date (Month Year): 10/18
Lot #: [id number]
Consent: Written consent obtained. Risks include but not limited to bruising, beading, irregular texture, ulceration, infection, allergic reaction, scar formation, incomplete augmentation, temporary nature, procedural pain.
Filler: Juvederm Ultra Plus XC
Expiration Date (Month Year): 12/18
Topical % #2: 7
Cheeks Filler Volume In Cc: 0.3

## 2018-02-20 NOTE — PROCEDURE: PATIENT SPECIFIC COUNSELING
Other (Free Text): Informed patient that she is healing well. Discussed with patient that she can start to massage the area with the back of an electric toothbrush. Patient also had concern about the indentations present below her nostrils. Dr. Nieto informed pt these areas can be treated with filler.
Detail Level: Detailed

## 2018-02-20 NOTE — PROCEDURE: REASSURANCE
Information Provided: Reassurance was provided to the patient that the clinical findings are within expected normal limits and do not require specific further evaluation or treatment at this time.
Follow-Up: no
Detail Level: Detailed

## 2018-03-14 RX ORDER — MINOXIDIL 10 MG/1
TABLET ORAL
Qty: 90 | Refills: 3 | Status: ERX

## 2018-04-13 ENCOUNTER — APPOINTMENT (RX ONLY)
Dept: URBAN - METROPOLITAN AREA CLINIC 12 | Facility: CLINIC | Age: 68
Setting detail: DERMATOLOGY
End: 2018-04-13

## 2018-04-13 DIAGNOSIS — Z41.9 ENCOUNTER FOR PROCEDURE FOR PURPOSES OTHER THAN REMEDYING HEALTH STATE, UNSPECIFIED: ICD-10-CM

## 2018-04-13 PROCEDURE — ? FILLERS

## 2018-04-13 NOTE — PROCEDURE: FILLERS
Additional Area 5 Volume In Cc: 0.2
Vermillion Lips Filler Volume In Cc: 0
Topical Anesthetic Base: ointment
Topical % #1: 23
Additional Area 2 Location: forehead depressions
Administered By (Optional): Theresa Vasquez
Show Price In Note?: yes
Map Statment: See attached map for complete details
Expiration Date (Month Year): 11/2019
Additional Area 5 Location: [facility]
Filler: Belotero
Price $ (Numeric Only, No Text Please.): 859
Lot #: [id number]
Additional Area 1 Location: glabellar/nasal root lines
Additional Area 1 Location: shadow upper lip
Lot #: [id number]
Detail Level: Simple
Filler: Kim Sky (Perlane-L)
Additional Area 3 Location: perioral lines
Topical Anesthetic #1: lidocaine
Topical Anesthetic #2: tetracaine
Temple Hollows Filler Volume In Cc: 0.3
Cheeks Filler Volume In Cc: 0.8
Filler: Juvederm Ultra Plus XC
Consent: Written consent obtained. Risks include but not limited to bruising, beading, irregular texture, ulceration, infection, allergic reaction, scar formation, incomplete augmentation, temporary nature, procedural pain.
Topical % #2: 7
Additional Area 4 Location: chin

## 2018-06-14 ENCOUNTER — APPOINTMENT (RX ONLY)
Dept: URBAN - METROPOLITAN AREA OTHER 9 | Facility: OTHER | Age: 68
Setting detail: DERMATOLOGY
End: 2018-06-14

## 2018-06-14 DIAGNOSIS — L82.1 OTHER SEBORRHEIC KERATOSIS: ICD-10-CM

## 2018-06-14 DIAGNOSIS — H00.01 HORDEOLUM EXTERNUM: ICD-10-CM

## 2018-06-14 PROBLEM — H00.014 HORDEOLUM EXTERNUM LEFT UPPER EYELID: Status: ACTIVE | Noted: 2018-06-14

## 2018-06-14 PROCEDURE — ? COUNSELING

## 2018-06-14 PROCEDURE — ? OTHER

## 2018-06-14 PROCEDURE — 99213 OFFICE O/P EST LOW 20 MIN: CPT

## 2018-06-14 ASSESSMENT — LOCATION SIMPLE DESCRIPTION DERM
LOCATION SIMPLE: LEFT LOWER BACK
LOCATION SIMPLE: RIGHT UPPER BACK
LOCATION SIMPLE: LEFT MEDIAL SUPERIOR TARSAL REGION
LOCATION SIMPLE: LEFT HAND
LOCATION SIMPLE: LEFT UPPER BACK
LOCATION SIMPLE: RIGHT HAND

## 2018-06-14 ASSESSMENT — LOCATION DETAILED DESCRIPTION DERM
LOCATION DETAILED: LEFT MEDIAL SUPERIOR TARSAL REGION
LOCATION DETAILED: LEFT SUPERIOR MEDIAL MIDBACK
LOCATION DETAILED: LEFT MID-UPPER BACK
LOCATION DETAILED: RIGHT ULNAR DORSAL HAND
LOCATION DETAILED: LEFT RADIAL DORSAL HAND
LOCATION DETAILED: RIGHT INFERIOR UPPER BACK

## 2018-06-14 ASSESSMENT — LOCATION ZONE DERM
LOCATION ZONE: HAND
LOCATION ZONE: EYELID
LOCATION ZONE: TRUNK

## 2018-06-14 NOTE — PROCEDURE: OTHER
Note Text (......Xxx Chief Complaint.): This diagnosis correlates with the
Other (Free Text): Olvin advised patient to apply warm compress to affected area and wash eye ball with Luke warm water. If problem still persist visit ophthalmologist.
Detail Level: Simple

## 2018-10-19 ENCOUNTER — APPOINTMENT (RX ONLY)
Dept: URBAN - METROPOLITAN AREA CLINIC 12 | Facility: CLINIC | Age: 68
Setting detail: DERMATOLOGY
End: 2018-10-19

## 2018-10-19 DIAGNOSIS — D48.5 NEOPLASM OF UNCERTAIN BEHAVIOR OF SKIN: ICD-10-CM

## 2018-10-19 DIAGNOSIS — Z48.89 ENCOUNTER FOR OTHER SPECIFIED SURGICAL AFTERCARE: ICD-10-CM

## 2018-10-19 PROCEDURE — ? PATIENT SPECIFIC COUNSELING

## 2018-10-19 PROCEDURE — ? SEPARATE AND IDENTIFIABLE DOCUMENTATION

## 2018-10-19 PROCEDURE — ? PHOTOS OBTAINED

## 2018-10-19 PROCEDURE — 11400 EXC TR-EXT B9+MARG 0.5 CM<: CPT

## 2018-10-19 PROCEDURE — ? COUNSELING - POST-OP CHECK, RHINOPLASTY

## 2018-10-19 PROCEDURE — ? COUNSELING EXCISION - BENIGN

## 2018-10-19 PROCEDURE — ? EXCISION, CUTANEOUS (BENIGN)

## 2018-10-19 PROCEDURE — 99024 POSTOP FOLLOW-UP VISIT: CPT

## 2018-10-19 ASSESSMENT — LOCATION ZONE DERM
LOCATION ZONE: AXILLAE
LOCATION ZONE: NOSE

## 2018-10-19 ASSESSMENT — LOCATION SIMPLE DESCRIPTION DERM
LOCATION SIMPLE: LEFT AXILLARY VAULT
LOCATION SIMPLE: NOSE

## 2018-10-19 ASSESSMENT — LOCATION DETAILED DESCRIPTION DERM
LOCATION DETAILED: NASAL DORSUM
LOCATION DETAILED: LEFT AXILLARY VAULT

## 2018-10-19 NOTE — PROCEDURE: PHOTOS OBTAINED
Photographed Locations: Photos were obtained of the surgical site(s).
Follow-Up Interval: day
Follow-Up For Additional Photos?: no
Detail Level: Simple

## 2018-10-19 NOTE — PROCEDURE: EXCISION, CUTANEOUS (BENIGN)
V-Y Plasty: The defect edges were debeveled with a #15 scalpel blade.  Given the location of the defect, shape of the defect and the proximity to free margins, a V-Y advancement flap was deemed most appropriate.  Using a sterile surgical marker, an appropriate advancement flap was drawn incorporating the defect and placing the expected incisions within the relaxed skin tension lines where possible.  The area thus outlined was incised deep to adipose tissue with a #15 scalpel blade.  The skin margins were undermined to an appropriate distance in all directions utilizing iris scissors.
Dermal Sutures: Vicryl
Mastoid Interpolation Flap: A decision was made to reconstruct the defect utilizing an interpolation axial flap and a staged reconstruction.  A telfa template was made of the defect.  This telfa template was then used to outline the mastoid interpolation flap.  The donor area for the pedicle flap was then injected with anesthesia.  The flap was excised through the skin and subcutaneous tissue down to the layer of the underlying musculature.  The pedicle flap was carefully excised within this deep plane to maintain its blood supply.  The edges of the donor site were undermined.   The donor site was closed in a primary fashion.  The pedicle was then rotated into position and sutured.  Once the tube was sutured into place, adequate blood supply was confirmed with blanching and refill.  The pedicle was then wrapped with xeroform gauze and dressed appropriately with a telfa and gauze bandage to ensure continued blood supply and protect the attached pedicle.
Island Pedicle Flap: The defect edges were debeveled with a #15 scalpel blade.  Given the location of the defect, shape of the defect and the proximity to free margins an island pedicle advancement flap was deemed most appropriate.  Using a sterile surgical marker, an appropriate advancement flap was drawn incorporating the defect, outlining the appropriate donor tissue and placing the expected incisions within the relaxed skin tension lines where possible.    The area thus outlined was incised deep to adipose tissue with a #15 scalpel blade.  The skin margins were undermined to an appropriate distance in all directions around the primary defect and laterally outward around the island pedicle utilizing iris scissors.  There was minimal undermining beneath the pedicle flap.
Epidermal Autograft: The defect edges were debeveled with a #15 scalpel blade.  Given the location of the defect, shape of the defect and the proximity to free margins an epidermal autograft was deemed most appropriate.  Using a sterile surgical marker, the primary defect shape was transferred to the donor site. The epidermal graft was then harvested.  The skin graft was then placed in the primary defect and oriented appropriately.
Epidermal Sutures: 5-0
O-T Advancement Flap: The defect edges were debeveled with a #15 scalpel blade.  Given the location of the defect, shape of the defect and the proximity to free margins an O-T advancement flap was deemed most appropriate.  Using a sterile surgical marker, an appropriate advancement flap was drawn incorporating the defect and placing the expected incisions within the relaxed skin tension lines where possible.    The area thus outlined was incised deep to adipose tissue with a #15 scalpel blade.  The skin margins were undermined to an appropriate distance in all directions utilizing iris scissors.
Nasolabial Flap: The defect edges were debeveled with a #15 scalpel blade.  Given the location of the defect and the proximity to free margins, a nasolabial flap was deemed most appropriate.  Using a sterile surgical marker, an appropriate nasolabial flap was drawn incorporating the defect.  The area thus outlined was incised deep to adipose tissue with a #15 scalpel blade.  The skin margins were undermined to an appropriate distance in all directions utilizing iris scissors.
Melolabial Interpolation Flap: A decision was made to reconstruct the defect utilizing an interpolation axial flap and a staged reconstruction.  A telfa template was made of the defect.  This telfa template was then used to outline the melolabial interpolation flap.  The donor area for the pedicle flap was then injected with anesthesia.  The flap was excised through the skin and subcutaneous tissue down to the layer of the underlying musculature.  The pedicle flap was carefully excised within this deep plane to maintain its blood supply.  The edges of the donor site were undermined.   The donor site was closed in a primary fashion.  The pedicle was then rotated into position and sutured.  Once the tube was sutured into place, adequate blood supply was confirmed with blanching and refill.  The pedicle was then wrapped with xeroform gauze and dressed appropriately with a telfa and gauze bandage to ensure continued blood supply and protect the attached pedicle.
Size Of Lesion In Cm: 1
Cheek Advancement Flap: The defect edges were debeveled with a #15 scalpel blade.  Given the location of the defect, shape of the defect and the proximity to free margins a cheek advancement flap was deemed most appropriate.  Using a sterile surgical marker, an appropriate advancement flap was drawn incorporating the defect and placing the expected incisions within the relaxed skin tension lines where possible. The incision was carried down the alar-facial groove and nasolabial fold and up the nasal sidewall as necessary to release the tension and allow advancement. The area thus outlined was incised deep to adipose tissue with a #15 scalpel blade.  The skin margins were undermined to an appropriate distance in all directions utilizing iris scissors.
Dermal Autograft: The defect edges were debeveled with a #15 scalpel blade.  Given the location of the defect, shape of the defect and the proximity to free margins, a dermal autograft was deemed most appropriate.  Using a sterile surgical marker, the primary defect shape was transferred to the donor site. The area thus outlined was incised deep to adipose tissue with a #15 scalpel blade.  The harvested graft was then trimmed of adipose and epidermal tissue until only dermis was left.  The skin graft was then placed in the primary defect and oriented appropriately.
M-Plasty: The lesion was extirpated to the level of the fat with a #15 scalpel blade.  Given the location of the defect, shape of the defect and the proximity to free margins, an M-plasty was deemed most appropriate for repair to reduce the amount of healthy tissue removed.  The appropriate transposition arms of the M-plasty were drawn placing the expected incisions within the relaxed skin tension lines where possible. The area thus outlined was incised deep to adipose tissue with a #15 scalpel blade. The skin margins were undermined to an appropriate distance in all directions utilizing iris scissors. The limbs of the M were then closed.
Transposition Flap: The defect edges were debeveled with a #15 scalpel blade.  Given the location of the defect and the proximity to free margins a transposition flap was deemed most appropriate.  Using a sterile surgical marker, an appropriate transposition flap was drawn incorporating the defect.    The area thus outlined was incised deep to adipose tissue with a #15 scalpel blade.  The skin margins were undermined to an appropriate distance in all directions utilizing iris scissors.
Notification Instructions: Patient will be notified of results. However, the patient was instructed to call the office if not contacted within 2 weeks.
Dermal Sutures: 4-0
Approach And Technique - Excisional Biopsy: An excisional biopsy was designed encompasing the lesion. After ensuring adequate anesthesia, the lesion was excised with a #15 scalpel blade. The specimen was sent for pathologic evaluation.
Surgeon: Dr. Nieto
Cultured Epidermal Autograft: The defect edges were debeveled with a #15 scalpel blade.  Given the location of the defect, shape of the defect and the proximity to free margins, a tissue cultured epidermal autograft was deemed most appropriate.  The graft was then trimmed to fit the size of the defect.  The graft was then placed in the primary defect and oriented appropriately.
Dorsal Nasal Flap: The defect edges were debeveled with a #15 scalpel blade.  Given the location of the defect and the proximity to free margins a dorsal nasal flap was deemed most appropriate.  Using a sterile surgical marker, an appropriate dorsal nasal flap was drawn around the defect.    The area thus outlined was incised deep to adipose tissue with a #15 scalpel blade.  The skin margins were undermined to an appropriate distance in all directions utilizing iris scissors.
Island Pedicle Flap- Requiring A Named Axial Vessel: The defect edges were debeveled with a #15 scalpel blade.  Given the location of the defect, shape of the defect and the proximity to free margins an island pedicle advancement flap was deemed most appropriate.  Using a sterile surgical marker, an appropriate advancement flap was drawn, based on the axial vessel mentioned above, incorporating the defect, outlining the appropriate donor tissue and placing the expected incisions within the relaxed skin tension lines where possible.    The area thus outlined was incised deep to adipose tissue with a #15 scalpel blade.  The skin margins were undermined to an appropriate distance in all directions around the primary defect and laterally outward around the island pedicle utilizing iris scissors.  There was minimal undermining beneath the pedicle flap.
Pedicled Retroauricular Flap: A decision was made to reconstruct the defect utilizing a pedicled retroauricular flap reconstruction.  The pedicle flap was marked, injected with local anesthesia, and carefully excised within the deep subcutaneous plane to maintain its blood supply.  The edges of the donor site were undermined.   The donor site was closed in a primary fashion.  The pedicle was then rotated into position and sutured.  Once the flap was sutured into place, adequate blood supply was confirmed with blanching and refill.  The pedicle was then dressed appropriately with antibiotic ointment and gauze bandage to ensure continued blood supply.
X Size Of Lesion In Cm (Optional): 0
Melolabial Flap: The defect edges were debeveled with a #15 scalpel blade.  Given the location of the defect and the proximity to free margins a melolabial flap was deemed most appropriate.  Using a sterile surgical marker, an appropriate melolabial flap was drawn incorporating the defect.    The area thus outlined was incised deep to adipose tissue with a #15 scalpel blade.  The skin margins were undermined to an appropriate distance in all directions utilizing iris scissors.
Double M-Plasty: The lesion was extirpated to the level of the fat with a #15 scalpel blade.  Given the location of the defect, shape of the defect and the proximity to free margins, a double M-plasty was deemed most appropriate for repair to reduce the amount of healthy tissue removed.  The appropriate transposition arms of the M-plastys were drawn placing the expected incisions within the relaxed skin tension lines where possible. The area thus outlined was incised deep to adipose tissue with a #15 scalpel blade. The skin margins were undermined to an appropriate distance in all directions utilizing iris scissors. The limbs of the M's were then closed.
Consent was obtained from the patient. The risks and benefits to therapy were discussed in detail. Specifically, the risks of infection, scarring, bleeding, prolonged wound healing, incomplete removal, allergy to anesthesia, nerve injury and recurrence were addressed. Prior to the procedure, the treatment site was clearly identified and confirmed by the patient. All components of Universal Protocol/PAUSE Rule completed.
Complex Repair: Complex closure of the defect was planned to allow for a tension free repair.
Cheek Interpolation Flap: A decision was made to reconstruct the defect utilizing an interpolation axial flap and a staged reconstruction.  A telfa template was made of the defect.  This telfa template was then used to outline the Cheek Interpolation flap.  The donor area for the pedicle flap was then injected with anesthesia.  The flap was excised through the skin and subcutaneous tissue down to the layer of the underlying musculature.  The interpolation flap was carefully excised within this deep plane to maintain its blood supply.  The edges of the donor site were undermined.   The donor site was closed in a primary fashion.  The pedicle was then rotated into position and sutured.  Once the tube was sutured into place, adequate blood supply was confirmed with blanching and refill.  The pedicle was then wrapped with xeroform gauze and dressed appropriately with a telfa and gauze bandage to ensure continued blood supply and protect the attached pedicle.
S Plasty: Given the location and shape of the defect, and the orientation of relaxed skin tension lines, an S-plasty was deemed most appropriate for repair.  Using a sterile surgical marker, the appropriate outline of the S-plasty was drawn, incorporating the defect and placing the expected incisions within the relaxed skin tension lines where possible.  The area thus outlined was incised deep to adipose tissue with a #15 scalpel blade.  The skin margins were undermined to an appropriate distance in all directions utilizing iris scissors. The skin flaps were advanced over the defect.  The opposing margins were then approximated with interrupted buried subcutaneous sutures.
Cartilage Graft: The defect edges were debeveled with a #15 scalpel blade.  Given the location of the defect, shape of the defect, the fact the defect involved a full thickness cartilage defect a cartilage graft was deemed most appropriate.  An appropriate donor site was identified, cleansed, and anesthetized. The cartilage graft was then harvested and transferred to the recipient site, oriented appropriately and then sutured into place.  The secondary defect was then repaired using a primary closure.
Paramedian Forehead Flap: A decision was made to reconstruct the defect utilizing an interpolation axial flap and a staged reconstruction.  A telfa template was made of the defect.  This telfa template was then used to outline the paramedian forehead pedicle flap.  The donor area for the pedicle flap was then injected with anesthesia.  The flap was excised through the skin and subcutaneous tissue down to the layer of the underlying musculature.  The pedicle flap was carefully excised within this deep plane to maintain its blood supply.  The edges of the donor site were undermined.   The donor site was closed in a primary fashion.  The pedicle was then rotated into position and sutured.  Once the tube was sutured into place, adequate blood supply was confirmed with blanching and refill.  The pedicle was then wrapped with xeroform gauze and dressed appropriately with a telfa and gauze bandage to ensure continued blood supply and protect the attached pedicle.
A-T Advancement Flap: The defect edges were debeveled with a #15 scalpel blade.  Given the location of the defect, shape of the defect and the proximity to free margins an A-T advancement flap was deemed most appropriate.  Using a sterile surgical marker, an appropriate advancement flap was drawn incorporating the defect and placing the expected incisions within the relaxed skin tension lines where possible.    The area thus outlined was incised deep to adipose tissue with a #15 scalpel blade.  The skin margins were undermined to an appropriate distance in all directions utilizing iris scissors.
Staged Pedicle Interpolation Flap: A staged pedicle interpolation flap was selected to achieve optimal aesthetic result, restore normal anatomic appearance and avoid distortion of normal anatomy and expedite and facilitate wound healing. The patient was prepped and draped in the usual manner. A template of the defect was utilized to design the pedicle flap. The donor area was anesthetized. The margins were incised to the level of the superficial fascia with a #15 blade. Undermining of the flap was performed in the fascial plane. Hemostasis was achieved. The pedicle flap was then placed into the defect and the flap was trimmed to match the shape of the defect. The flap donor site wound edges were the re-approximated utilizing deep buried sutures, and simple interrupted sutures. Division of the pedicle flap will be performed in 3 weeks.
Ebl Minimal?: yes
Double-Opposing Z-Plasty: The defect edges were debeveled with a #15 scalpel blade.  Given the location of the defect, shape of the defect and the proximity to free margins, a double-opposing Z-plasty (double transposition flap) was deemed most appropriate.  Using a sterile surgical marker, the appropriate transposition flaps were drawn incorporating the defect and placing the expected incisions within the relaxed skin tension lines where possible.  The area thus outlined was incised deep to adipose tissue with a #15 scalpel blade.  The skin margins were undermined to an appropriate distance in all directions utilizing iris scissors.  Hemostasis was achieved with electrocautery.  The flaps were then transposed into place, one clockwise and the other counterclockwise, and anchored with interrupted buried subcutaneous sutures.
Rotation Flap: The defect edges were debeveled with a #15 scalpel blade.  Given the location of the defect, shape of the defect and the proximity to free margins, a rotation flap was deemed most appropriate.  Using a sterile surgical marker, an appropriate rotation flap was drawn incorporating the defect and placing the expected incisions within the relaxed skin tension lines where possible.  The area thus outlined was incised deep to adipose tissue with a #15 scalpel blade.  The skin margins were undermined to an appropriate distance in all directions utilizing iris scissors.
Intermediate Layered Repair: Intermediate repair was planned with layered closure after freeing the skin edges for advancement.
Antia-Buch Procedure: An Antia-Buch procedure with wedge cartilage excision was performed for reconstruction of the ear defect. Incisions were made in the helical sulcus with triangular composite excisions of skin and cartilage, leaving the postauricular skin intact as a vascular base. A triangular full thickness wedge excision was completed and the backcut flaps were rotated around to close the defect. The helical rim was approximated first to precisely align the curvature of the helix. Closure was performed to include cartilage and the skin flaps for good cartilage contact and complete coverage over the approximated cartilage.
Epidermal Sutures: Nylon
Bill For Surgical Tray: no
Linear Repair Length, In Cm: 1.6
Epidermal Closure: simple interrupted
Modified Advancement Flap: The defect edges were debeveled with a #15 scalpel blade.  Given the location of the defect, shape of the defect and the proximity to free margins a modified advancement flap was deemed most appropriate.  Using a sterile surgical marker, an appropriate advancement flap was drawn incorporating the defect and placing the expected incisions within the relaxed skin tension lines where possible.    The area thus outlined was incised deep to adipose tissue with a #15 scalpel blade.  The skin margins were undermined to an appropriate distance in all directions utilizing iris scissors.
Helical Advancement Flap: The defect edges were debeveled with a #15 blade scalpel.  Given the location of the defect and the proximity to free margins (helical rim) a double helical rim advancement flap was deemed most appropriate.  Using a sterile surgical marker, the appropriate advancement flaps were drawn incorporating the defect and placing the expected incisions between the helical rim and antihelix where possible.  The area thus outlined was incised through and through with a #15 scalpel blade.  With a skin hook and iris scissors, the flaps were gently and sharply undermined and freed up.
Split Thickness Skin Graft: The defect edges were debeveled with a #15 scalpel blade.  Given the location of the defect, shape of the defect and the proximity to free margins, a split thickness skin graft was deemed most appropriate.  Using a sterile surgical marker, the primary defect shape was transferred to the donor site. The split thickness graft was then harvested.  The skin graft was then placed in the primary defect and oriented appropriately.
Xenograft: The defect edges were debeveled with a #15 scalpel blade.  Given the location of the defect, shape of the defect and the proximity to free margins, a xenograft was deemed most appropriate.  The graft was then trimmed to fit the size of the defect.  The graft was then placed in the primary defect and oriented appropriately.
Full Thickness Skin Graft: The defect edges were debeveled with a #15 scalpel blade.  Given the location of the defect, shape of the defect and the proximity to free margins, a full thickness skin graft was deemed most appropriate.  Using a sterile surgical marker, the primary defect shape was transferred to the donor site. The area thus outlined was incised deep to adipose tissue with a #15 scalpel blade.  The harvested graft was then trimmed of adipose tissue until only dermis and epidermis was left.  The skin margins of the secondary defect were undermined to an appropriate distance in all directions utilizing iris scissors.  The secondary defect was closed with interrupted buried subcutaneous sutures.  The skin edges were then re-apposed with running  sutures.  The skin graft was then placed in the primary defect and oriented appropriately.
Epidermal Sutures: Ethilon
Excision Method: Excision
Repair Type: Nonlayered (Simple)
Double-Opposing Semicircular Flap: The defect was measured and tangents drawn to establish landmarks for the double-opposing semicircular flaps. After measuring the radius and flap lengths, the flaps were marked, the markings incised and cautery used to undermine and raise the flaps. They were rotated into position to reconstruct the defect and the donor sites were closed linearly.
Simple repair was planned with closure after freeing the skin edges for advancement.
Advancement Flap (Single): The defect edges were debeveled with a #15 scalpel blade.  Given the location of the defect and the proximity to free margins, a single advancement flap was deemed most appropriate.  Using a sterile surgical marker, an appropriate advancement flap was drawn incorporating the defect and placing the expected incisions within the relaxed skin tension lines where possible.  The area thus outlined was incised deep to adipose tissue with a #15 scalpel blade.  The skin margins were undermined to an appropriate distance in all directions utilizing iris scissors.
Z-Plasty: The lesion was extirpated to the level of the fat with a #15 scalpel blade.  Given the location of the defect, shape of the defect and the proximity to free margins, a Z-plasty was deemed most appropriate for repair.  Using a sterile surgical marker, the appropriate transposition arms of the Z-plasty were drawn incorporating the defect and placing the expected incisions within the relaxed skin tension lines where possible.  The area thus outlined was incised deep to adipose tissue with a #15 scalpel blade.  The skin margins were undermined to an appropriate distance in all directions utilizing iris scissors.  The opposing transposition arms were then transposed into place in opposite direction and inset.
Medical Necessity Clause: This procedure was medically necessary because the lesion that was treated was:
Bilobed Flap: The defect edges were debeveled with a #15 scalpel blade.  Given the location of the defect and the proximity to free margins, a bilobe flap was deemed most appropriate.  Using a sterile surgical marker, an appropriate bilobe flap drawn around the defect.  The area thus outlined was incised deep to adipose tissue with a #15 scalpel blade.  The skin margins were undermined to an appropriate distance in all directions utilizing iris scissors.
Approach And Technique - Excision: An excision was designed encompasing the lesion with the margin as stated. After ensuring adequate anesthesia, the design was incised with a #15 scalpel blade. The specimen was sent for pathologic evaluation.
Cheek-To-Nose Interpolation Flap: A decision was made to reconstruct the defect utilizing an interpolation axial flap and a staged reconstruction.  A telfa template was made of the defect.  This telfa template was then used to outline the Cheek-To-Nose Interpolation flap.  The donor area for the pedicle flap was then injected with anesthesia.  The flap was excised through the skin and subcutaneous tissue down to the layer of the underlying musculature.  The interpolation flap was carefully excised within this deep plane to maintain its blood supply.  The edges of the donor site were undermined.   The donor site was closed in a primary fashion.  The pedicle was then rotated into position and sutured.  Once the tube was sutured into place, adequate blood supply was confirmed with blanching and refill.  The pedicle was then wrapped with xeroform gauze and dressed appropriately with a telfa and gauze bandage to ensure continued blood supply and protect the attached pedicle.
Rhombic Flap: The defect edges were debeveled with a #15 scalpel blade.  Given the location of the defect and the proximity to free margins, a rhombic flap was deemed most appropriate.  Using a sterile surgical marker, an appropriate rhombic flap was drawn incorporating the defect.  The area thus outlined was incised deep to adipose tissue with a #15 scalpel blade.  The skin margins were undermined to an appropriate distance in all directions utilizing iris scissors.
Composite Graft: The defect edges were debeveled with a #15 scalpel blade.  Given the location of the defect, shape of the defect, the proximity to free margins and the fact the defect was full thickness a composite graft was deemed most appropriate.  The defect was outline and then transferred to the donor site.  A full thickness graft was then excised from the donor site. The graft was then placed in the primary defect, oriented appropriately and then sutured into place.  The secondary defect was then repaired using a primary closure.
Path Notes (To The Pathologist): Please check margins.
Advancement Flap (Double): The defect edges were debeveled with a #15 scalpel blade.  Given the location of the defect and the proximity to free margins, a double advancement flap was deemed most appropriate.  Using a sterile surgical marker, the appropriate advancement flaps were drawn incorporating the defect and placing the expected incisions within the relaxed skin tension lines where possible.  The area thus outlined was incised deep to adipose tissue with a #15 scalpel blade.  The skin margins were undermined to an appropriate distance in all directions utilizing iris scissors.
Detail Level: Detailed
Suture Removal: 10 days
Burow's triangles were removed to repair standing cone deformities.  These defects were repaired with the same technique and sutured.
Ear Star Wedge Flap: An ear wedge excision with lateral wedge extensions was identified as the best reconstuctive method of the defect. These were designed to incorporate the defect. The full thickness wedge was excised as were two crescentic extensions to allow for a small diameter reduction to the ear and ease of closing and advancing the flaps. The flaps were inset and closure accomplished.
# Of Epidermal Sutures (Optional): 3
W-Plasty: The lesion was extirpated to the level of the fat with a #15 scalpel blade.  Given the location of the defect, shape of the defect and the proximity to free margins, a W-plasty was deemed most appropriate for repair.  Using a sterile surgical marker, the appropriate transposition arms of the W-plasty were drawn incorporating the defect and placing the expected incisions within the relaxed skin tension lines where possible.  The area thus outlined was incised deep to adipose tissue with a #15 scalpel blade.  The skin margins were undermined to an appropriate distance in all directions utilizing iris scissors.  The opposing transposition arms were then transposed into place in opposite direction and inset.
Anesthesia Volume (In Cc): 3.00

## 2018-10-19 NOTE — HPI: POST-OP CHECK, COSMETIC RHINOPLASTY
History: The patient reports a new growth on the top of her nasal dorsum that has recently appeared. She is unsure if this may be due to her glasses resting near this area, or if it is a cause for concern. She is present today for evaluation and management.

## 2018-10-19 NOTE — HPI: INFECTION (FOLLICULITIS)
How Severe Is It?: moderate
Is This A New Presentation, Or A Follow-Up?: Infection
Additional History: The patient reports trying to squeeze the lesion at home, which further irritated it. She is present for evaluation and management and treatment.

## 2018-10-19 NOTE — PROCEDURE: PATIENT SPECIFIC COUNSELING
Detail Level: Simple
Other (Free Text): Dr. Nieto reassured the patient that the growth on the top of her nasal dorsum is the cartilage he placed during her rhinoplasty, and is not a cause for concern. He reassured her that her nose looks great. He counseled the patient he does not recommend placing any filler on her nasal tip, however she may receive light fillers on the nasal dorsum to even out the texture, if desired. The patient verbalized understanding to all discussed.

## 2018-10-29 ENCOUNTER — APPOINTMENT (RX ONLY)
Dept: URBAN - METROPOLITAN AREA CLINIC 12 | Facility: CLINIC | Age: 68
Setting detail: DERMATOLOGY
End: 2018-10-29

## 2018-10-29 DIAGNOSIS — Z48.89 ENCOUNTER FOR OTHER SPECIFIED SURGICAL AFTERCARE: ICD-10-CM

## 2018-10-29 DIAGNOSIS — L72.8 OTHER FOLLICULAR CYSTS OF THE SKIN AND SUBCUTANEOUS TISSUE: ICD-10-CM

## 2018-10-29 PROCEDURE — ? COUNSELING - POST-OP CHECK

## 2018-10-29 PROCEDURE — ? ACNE SURGERY COSMETIC

## 2018-10-29 PROCEDURE — ? PATIENT SPECIFIC COUNSELING

## 2018-10-29 PROCEDURE — ? POST-OP CHECK

## 2018-10-29 PROCEDURE — 99024 POSTOP FOLLOW-UP VISIT: CPT

## 2018-10-29 ASSESSMENT — LOCATION ZONE DERM
LOCATION ZONE: TRUNK
LOCATION ZONE: AXILLAE

## 2018-10-29 ASSESSMENT — LOCATION DETAILED DESCRIPTION DERM
LOCATION DETAILED: LEFT AXILLARY VAULT
LOCATION DETAILED: UPPER STERNUM

## 2018-10-29 ASSESSMENT — LOCATION SIMPLE DESCRIPTION DERM
LOCATION SIMPLE: LEFT AXILLARY VAULT
LOCATION SIMPLE: CHEST

## 2018-10-29 NOTE — PROCEDURE: PATIENT SPECIFIC COUNSELING
Other (Free Text): Discussed with patient that she is healing well.\\nPatient can resume back to normal activity.
Detail Level: Detailed

## 2018-10-29 NOTE — PROCEDURE: POST-OP CHECK
Detail Level: Simple
Wound Evaluated By: Dr. Nilesh Nieto
Add Postop Global No-Charge Code (47269)?: yes

## 2018-11-26 ENCOUNTER — RX ONLY (OUTPATIENT)
Age: 68
Setting detail: RX ONLY
End: 2018-11-26

## 2018-11-26 RX ORDER — EMOLLIENT COMBINATION NO.53
FOAM (GRAM) TOPICAL
Qty: 6 | Refills: 4 | Status: ERX | COMMUNITY
Start: 2018-11-26

## 2019-05-07 ENCOUNTER — APPOINTMENT (RX ONLY)
Dept: URBAN - METROPOLITAN AREA CLINIC 12 | Facility: CLINIC | Age: 69
Setting detail: DERMATOLOGY
End: 2019-05-07

## 2019-05-07 DIAGNOSIS — Z41.9 ENCOUNTER FOR PROCEDURE FOR PURPOSES OTHER THAN REMEDYING HEALTH STATE, UNSPECIFIED: ICD-10-CM

## 2019-05-07 PROCEDURE — ? SCLEROTHERAPY-VISUAL

## 2019-05-07 ASSESSMENT — LOCATION ZONE DERM: LOCATION ZONE: LEG

## 2019-05-07 ASSESSMENT — LOCATION DETAILED DESCRIPTION DERM
LOCATION DETAILED: RIGHT ANTERIOR DISTAL THIGH
LOCATION DETAILED: RIGHT PROXIMAL PRETIBIAL REGION
LOCATION DETAILED: LEFT PROXIMAL PRETIBIAL REGION
LOCATION DETAILED: RIGHT ANKLE
LOCATION DETAILED: LEFT KNEE
LOCATION DETAILED: LEFT ANTERIOR DISTAL THIGH
LOCATION DETAILED: RIGHT DISTAL PRETIBIAL REGION

## 2019-05-07 ASSESSMENT — LOCATION SIMPLE DESCRIPTION DERM
LOCATION SIMPLE: RIGHT THIGH
LOCATION SIMPLE: RIGHT ANKLE
LOCATION SIMPLE: LEFT PRETIBIAL REGION
LOCATION SIMPLE: RIGHT PRETIBIAL REGION
LOCATION SIMPLE: LEFT KNEE
LOCATION SIMPLE: LEFT THIGH

## 2019-05-07 NOTE — PROCEDURE: SCLEROTHERAPY-VISUAL
Detail Level: Detailed
Price $ (Use Numbers Only, No Text Please.): 170
Condition: spider veins
Volume In Cc: 3
Treatment: glycerin 70% mixed 2:1 with 1% lidocaine with epinephrine sterile solution
Consent: Prior to the procedure, written consent was obtained and risks, benefits, expectations and alternatives were reviewed, including, but not limited to,  ulceration, blood clots, scarring, superficial thrombophlebitis, deep venous thrombosis, poor wound healing, post inflammatory hyper or hypopigmentation, infection, pain, and leg swelling.

## 2019-06-18 ENCOUNTER — APPOINTMENT (RX ONLY)
Dept: URBAN - METROPOLITAN AREA CLINIC 12 | Facility: CLINIC | Age: 69
Setting detail: DERMATOLOGY
End: 2019-06-18

## 2019-06-18 DIAGNOSIS — Z41.9 ENCOUNTER FOR PROCEDURE FOR PURPOSES OTHER THAN REMEDYING HEALTH STATE, UNSPECIFIED: ICD-10-CM

## 2019-06-18 PROCEDURE — ? SKIN TAG REMOVAL COSMETIC

## 2019-06-18 PROCEDURE — ? BOTOX

## 2019-06-18 PROCEDURE — ? SCLEROTHERAPY-VISUAL

## 2019-06-18 PROCEDURE — ? FILLERS

## 2019-06-18 ASSESSMENT — LOCATION ZONE DERM
LOCATION ZONE: TRUNK
LOCATION ZONE: LIP

## 2019-06-18 ASSESSMENT — LOCATION DETAILED DESCRIPTION DERM
LOCATION DETAILED: LEFT UPPER CUTANEOUS LIP
LOCATION DETAILED: RIGHT MEDIAL SUPERIOR CHEST
LOCATION DETAILED: RIGHT UPPER CUTANEOUS LIP

## 2019-06-18 ASSESSMENT — LOCATION SIMPLE DESCRIPTION DERM
LOCATION SIMPLE: LEFT LIP
LOCATION SIMPLE: RIGHT LIP
LOCATION SIMPLE: CHEST

## 2019-06-18 NOTE — PROCEDURE: SCLEROTHERAPY-VISUAL
Volume In Cc: 0.5
Price $ (Use Numbers Only, No Text Please.): 80
Consent: Prior to the procedure, written consent was obtained and risks, benefits, expectations and alternatives were reviewed, including, but not limited to,  ulceration, blood clots, scarring, superficial thrombophlebitis, deep venous thrombosis, poor wound healing, post inflammatory hyper or hypopigmentation, infection, pain, and leg swelling.
Treatment: glycerin 70% mixed 2:1 with 1% lidocaine with epinephrine sterile solution
Condition: spider veins
Detail Level: Detailed

## 2019-06-18 NOTE — PROCEDURE: BOTOX
Lot #: [id number]
Tim Units: 0
Administered By (Optional): Theresa Vasquez
Topical Anesthesia?: 23% lidocaine, 7% tetracaine
Additional Area 2 Location: Chin
Additional Area 1 Location: in the brow
Postcare Instructions: Patient instructed to not lie down for 4 hours and limit physical activity for 24 hours. Patient instructed not to travel by airplane for 48 hours.
Glabellar Complex Units: 10
Consent: Written consent was obtained prior to the procedure. Risks, benefits, expectations and alternatives were discussed including, but not limited to, infection, bleeding, lid/brow ptosis, bruising, swelling, diplopia, temporary effects, incomplete chemical denervation and dissatisfaction with the cosmetic outcome. No guarantee or warranty was given or implied regarding longevity of results.
Expiration Date (Month Year): 10/2021
Map Statment: See attached map for complete details
Forehead Units: 2.5
Price Per Unit In $ (Use Numbers Only, No Text Please.): 17
Use Map Statement For Sites (Optional): Yes
Bill Summary Price Listed Below, Or Bill Total Of Units X Price Per Unit?: Bill #Units x Price Per Unit
Detail Level: Simple
Additional Area 3 Location: necklace lines
Dilution (U/0.1 Cc): 5

## 2019-06-18 NOTE — PROCEDURE: SKIN TAG REMOVAL COSMETIC
Detail Level: Detailed
Total Number Of Lesions Treated: 1
Removed With: scissors and forceps
Anesthesia Volume In Cc: 0
Consent: Written consent obtained and the risks of skin tag removal was reviewed with the patient including, but not limited to, bleeding, pigmentary change, infection, pain, and remote possibility of scarring.

## 2019-06-18 NOTE — PROCEDURE: FILLERS
Use Map Statement For Sites (Optional): Yes
Additional Area 2 Location: glabellar line
Lateral Canthal Filler Volume In Cc: 0
Topical Anesthetic Base: ointment
Lot #: [id number]
Additional Area 2 Location: eyelid cheek margin
Topical Anesthetic #2: tetracaine
Detail Level: Simple
Additional Area 3 Location: smile line
Filler: Belotero
Topical % #1: 23
Additional Area 3 Location: perioral lines
Map Statment: See attached map for complete details
Additional Area 4 Location: cheek depressions
Consent: Written consent obtained. Risks include but not limited to bruising, beading, irregular texture, ulceration, infection, allergic reaction, scar formation, incomplete augmentation, temporary nature, procedural pain.
Topical Anesthetic #1: lidocaine
Administered By (Optional): Theresa Vasquez RN
Filler: Juvederm Ultra Plus XC
Additional Area 4 Location: right ear lobe
Additional Area 5 Location: frontalis
Additional Area 1 Location: glabellar/nasal root lines
Additional Area 5 Volume In Cc: 0.2
Nasolabial Folds Filler Volume In Cc: 0.3
Topical % #2: 7
Additional Area 5 Location: oral commissures

## 2019-07-08 ENCOUNTER — APPOINTMENT (RX ONLY)
Dept: URBAN - METROPOLITAN AREA CLINIC 12 | Facility: CLINIC | Age: 69
Setting detail: DERMATOLOGY
End: 2019-07-08

## 2019-08-16 ENCOUNTER — APPOINTMENT (RX ONLY)
Dept: URBAN - METROPOLITAN AREA CLINIC 12 | Facility: CLINIC | Age: 69
Setting detail: DERMATOLOGY
End: 2019-08-16

## 2019-08-16 DIAGNOSIS — Z428 OTHER AFTERCARE INVOLVING THE USE OF PLASTIC SURGERY: ICD-10-CM

## 2019-08-16 PROCEDURE — 99024 POSTOP FOLLOW-UP VISIT: CPT

## 2019-08-16 PROCEDURE — ? OTHER

## 2019-08-16 PROCEDURE — ? CODE 99024 - NO CHARGE POST-OP VISIT

## 2019-08-16 NOTE — PROCEDURE: OTHER
Detail Level: Zone
Other (Free Text): Some of the filler to patients left lateral forehead created a nodule. That filler was manipulated today and appears much flatter than when patient came in.

## 2019-10-25 ENCOUNTER — APPOINTMENT (RX ONLY)
Dept: URBAN - METROPOLITAN AREA CLINIC 12 | Facility: CLINIC | Age: 69
Setting detail: DERMATOLOGY
End: 2019-10-25

## 2019-10-25 DIAGNOSIS — Z41.9 ENCOUNTER FOR PROCEDURE FOR PURPOSES OTHER THAN REMEDYING HEALTH STATE, UNSPECIFIED: ICD-10-CM

## 2019-10-25 PROCEDURE — ? BOTOX

## 2019-10-25 PROCEDURE — ? FILLERS

## 2019-10-25 NOTE — PROCEDURE: FILLERS
Jawline Filler Volume In Cc: 0
Topical % #1: 23
Cheeks Filler Volume In Cc: 0.7
Additional Area 2 Location: perioral lines
Additional Area 4 Location: cheek depressions
Detail Level: Simple
Price $ (Numeric Only, No Text Please.): 992
Use Map Statement For Sites (Optional): Yes
Lot #: [id number]
Topical Anesthetic #2: tetracaine
Topical Anesthetic Base: ointment
Additional Area 5 Location: frontalis rhytids
Additional Area 1 Location: glabellar lines
Additional Area 5 Location: forehead wrinkles
Topical % #2: 7
Filler: Kim Sky (Perlane-L)
Additional Area 2 Location: glabellar line
Additional Area 4 Location: eyelid and cheek margin
Consent: Written consent obtained. Risks include but not limited to bruising, beading, irregular texture, ulceration, infection, allergic reaction, scar formation, incomplete augmentation, temporary nature, procedural pain.
Expiration Date (Month Year): 02/2022
Topical Anesthetic #1: lidocaine
Administered By (Optional): Theresa Vasquez RN
Additional Area 3 Location: smile line
Additional Area 3 Location: eyebrow wrinkles
Map Statment: See attached map for complete details

## 2019-10-25 NOTE — PROCEDURE: BOTOX
Additional Area 3 Units: 0
Bill Summary Price Listed Below, Or Bill Total Of Units X Price Per Unit?: Bill #Units x Price Per Unit
Dilution (U/0.1 Cc): 5
Detail Level: Simple
Additional Area 3 Location: necklace lines
Expiration Date (Month Year): 03/2022
Use Map Statement For Sites (Optional): Yes
Price Per Unit In $ (Use Numbers Only, No Text Please.): 17
Forehead Units: 7.5
Additional Area 1 Location: sneer muscles
Lot #: [id number]
Administered By (Optional): Theresa Vasquez
Topical Anesthesia?: 23% lidocaine, 7% tetracaine
Map Statment: See attached map for complete details
Consent: Written consent was obtained prior to the procedure. Risks, benefits, expectations and alternatives were discussed including, but not limited to, infection, bleeding, lid/brow ptosis, bruising, swelling, diplopia, temporary effects, incomplete chemical denervation and dissatisfaction with the cosmetic outcome. No guarantee or warranty was given or implied regarding longevity of results.
Postcare Instructions: Patient instructed to not lie down for 4 hours and limit physical activity for 24 hours. Patient instructed not to travel by airplane for 48 hours.
Additional Area 2 Location: Chin

## 2019-12-09 ENCOUNTER — RX ONLY (OUTPATIENT)
Age: 69
Setting detail: RX ONLY
End: 2019-12-09

## 2019-12-09 ENCOUNTER — APPOINTMENT (RX ONLY)
Dept: URBAN - METROPOLITAN AREA OTHER 9 | Facility: OTHER | Age: 69
Setting detail: DERMATOLOGY
End: 2019-12-09

## 2019-12-09 DIAGNOSIS — L65.9 NONSCARRING HAIR LOSS, UNSPECIFIED: ICD-10-CM

## 2019-12-09 PROCEDURE — ? COUNSELING

## 2019-12-09 PROCEDURE — 99212 OFFICE O/P EST SF 10 MIN: CPT

## 2019-12-09 PROCEDURE — ? TREATMENT REGIMEN

## 2019-12-09 NOTE — PROCEDURE: TREATMENT REGIMEN
Detail Level: Detailed
Plan: Patient should continue taking minoxidil(oral) for hair loss. \\nPatient has 3 options, patient should take vitamin B daily and consider \\nPRF/PRP every 4-6 weeks. $800 each treatment. \\nRecommended laser cap.

## 2019-12-19 ENCOUNTER — RX ONLY (OUTPATIENT)
Age: 69
Setting detail: RX ONLY
End: 2019-12-19

## 2019-12-19 RX ORDER — EMOLLIENT COMBINATION NO.53
FOAM (GRAM) TOPICAL
Qty: 6 | Refills: 4 | Status: ERX

## 2020-01-06 ENCOUNTER — APPOINTMENT (RX ONLY)
Dept: URBAN - METROPOLITAN AREA OTHER 9 | Facility: OTHER | Age: 70
Setting detail: DERMATOLOGY
End: 2020-01-06

## 2020-01-06 DIAGNOSIS — L65.9 NONSCARRING HAIR LOSS, UNSPECIFIED: ICD-10-CM

## 2020-01-06 PROCEDURE — ? PLATELET RICH PLASMA INJECTION

## 2020-01-06 PROCEDURE — ? MICRONEEDLING

## 2020-01-06 NOTE — PROCEDURE: PLATELET RICH PLASMA INJECTION
Amount Of Blood Processed (Optional - Include Units): 60 cc
Amount Injected At This Location In Cc (Will Not Render If 0): 0
Amount Injected At This Location In Cc (Will Not Render If 0): 2
Show Additional Techniques: Yes
Price (Use Numbers Only, No Special Characters Or $): 259
Which Technique?: Default
Consent: Written consent obtained, risks reviewed including but not limited to pain, scarring, infection and incomplete improvement.  Patient understands the procedure is cosmetic in nature and will require out of pocket payment.
Post-Care Instructions: After the procedure, take precautions agains sun exposure. Do not apply sunscreen for 12 hours after the procedure. Do not apply make-up for 12 hours after the procedure. Avoid alcohol based toners for 10-14 days. After 2-3 days patients can return to their regular skin regimen.
Detail Level: Zone
Treatment Number (Optional): 1
Location #1: frontal hairline
Depth In Mm (Will Not Render If 0): 1.5

## 2020-01-06 NOTE — PROCEDURE: MICRONEEDLING
Depth In Mm (Location #4): 0.1
Post-Care Instructions: After the procedure, take precautions agains sun exposure. Do not apply sunscreen for 12 hours after the procedure. Do not apply make-up for 12 hours after the procedure. Avoid alcohol based toners for 10-14 days. After 2-3 days patients can return to their regular skin regimen.
Detail Level: Zone
Treatment Number (Optional): 1
Consent: Written consent obtained, risks reviewed including but not limited to pain, scarring, infection and incomplete improvement.  Patient understands the procedure is cosmetic in nature and will require out of pocket payment.

## 2020-01-24 ENCOUNTER — APPOINTMENT (RX ONLY)
Dept: URBAN - METROPOLITAN AREA CLINIC 12 | Facility: CLINIC | Age: 70
Setting detail: DERMATOLOGY
End: 2020-01-24

## 2020-01-24 DIAGNOSIS — Z41.9 ENCOUNTER FOR PROCEDURE FOR PURPOSES OTHER THAN REMEDYING HEALTH STATE, UNSPECIFIED: ICD-10-CM

## 2020-01-24 PROCEDURE — ? SCLEROTHERAPY-VISUAL

## 2020-01-24 PROCEDURE — ? FILLERS

## 2020-01-24 ASSESSMENT — LOCATION DETAILED DESCRIPTION DERM
LOCATION DETAILED: LEFT ANTERIOR DISTAL THIGH
LOCATION DETAILED: RIGHT DISTAL PRETIBIAL REGION
LOCATION DETAILED: LEFT ANTERIOR MEDIAL MALLEOLUS
LOCATION DETAILED: RIGHT ANTERIOR MEDIAL MALLEOLUS
LOCATION DETAILED: RIGHT UPPER CUTANEOUS LIP

## 2020-01-24 ASSESSMENT — LOCATION SIMPLE DESCRIPTION DERM
LOCATION SIMPLE: RIGHT ANKLE
LOCATION SIMPLE: RIGHT LIP
LOCATION SIMPLE: RIGHT PRETIBIAL REGION
LOCATION SIMPLE: LEFT THIGH
LOCATION SIMPLE: LEFT ANKLE

## 2020-01-24 ASSESSMENT — LOCATION ZONE DERM
LOCATION ZONE: LEG
LOCATION ZONE: LIP

## 2020-01-24 NOTE — PROCEDURE: FILLERS
Nasolabial Folds Filler Volume In Cc: 0
Additional Area 3 Location: smile line
Additional Area 3 Location: oral commissures
Consent: Written consent obtained. Risks include but not limited to bruising, beading, irregular texture, ulceration, infection, allergic reaction, scar formation, incomplete augmentation, temporary nature, procedural pain.
Topical Anesthetic #2: tetracaine
Administered By (Optional): Theresa Vasquez RN
Topical Anesthetic #1: lidocaine
Price $ (Numeric Only, No Text Please.): 150
Additional Area 4 Location: cheek depressions
Additional Area 4 Location: chin dimpling
Map Statment: See attached map for complete details
Topical Anesthetic Base: ointment
Filler: Kim Sky (Perlane-L)
Topical % #1: 23
Additional Area 5 Location: eyebrow wrinkles
Topical Anesthesia?: yes
Additional Area 1 Location: perioral lines
Additional Area 5 Location: frontalis rhytids
Topical % #2: 7
Additional Area 1 Location: glabellar or forehead lines
Lot #: residual
Additional Area 2 Location: glabellar line
Detail Level: Simple

## 2020-01-24 NOTE — PROCEDURE: SCLEROTHERAPY-VISUAL
Detail Level: Detailed
Volume In Cc: 4.2
Consent: Prior to the procedure, written consent was obtained and risks, benefits, expectations and alternatives were reviewed, including, but not limited to,  ulceration, blood clots, scarring, superficial thrombophlebitis, deep venous thrombosis, poor wound healing, post inflammatory hyper or hypopigmentation, infection, pain, and leg swelling.
Price $ (Use Numbers Only, No Text Please.): 260
Condition: spider veins
Treatment: glycerin 70% mixed 2:1 with 1% lidocaine with epinephrine sterile solution

## 2020-02-10 ENCOUNTER — APPOINTMENT (RX ONLY)
Dept: URBAN - METROPOLITAN AREA OTHER 9 | Facility: OTHER | Age: 70
Setting detail: DERMATOLOGY
End: 2020-02-10

## 2020-02-10 DIAGNOSIS — L65.9 NONSCARRING HAIR LOSS, UNSPECIFIED: ICD-10-CM

## 2020-02-10 PROCEDURE — ? OTHER

## 2020-02-10 PROCEDURE — ? MICRONEEDLING

## 2020-02-10 PROCEDURE — ? PLATELET RICH PLASMA INJECTION

## 2020-02-10 NOTE — PROCEDURE: MICRONEEDLING
Price (Use Numbers Only, No Special Characters Or $): 0
Detail Level: Zone
Depth In Mm (Location #1): 1.5
Post-Care Instructions: After the procedure, take precautions agains sun exposure. Do not apply sunscreen for 12 hours after the procedure. Do not apply make-up for 12 hours after the procedure. Avoid alcohol based toners for 10-14 days. After 2-3 days patients can return to their regular skin regimen.
Location #2: right temporal
Consent: Written consent obtained, risks reviewed including but not limited to pain, scarring, infection and incomplete improvement.  Patient understands the procedure is cosmetic in nature and will require out of pocket payment.
Location #1: frontal hairline
Treatment Number (Optional): 2
Location #4: vertex
Location #3: left temporal
Depth In Mm (Location #5): 0.1

## 2020-02-10 NOTE — PROCEDURE: PLATELET RICH PLASMA INJECTION
Price (Use Numbers Only, No Special Characters Or $): 428
Amount Injected At This Location In Cc (Will Not Render If 0): 0
Amount Injected At This Location In Cc (Will Not Render If 0): 1
Depth In Mm (Will Not Render If 0): 1.5
Post-Care Instructions: After the procedure, take precautions agains sun exposure. Do not apply sunscreen for 12 hours after the procedure. Do not apply make-up for 12 hours after the procedure. Avoid alcohol based toners for 10-14 days. After 2-3 days patients can return to their regular skin regimen.
Amount Injected At This Location In Cc (Will Not Render If 0): 2
Detail Level: Zone
Location #2: right temporal
Show Additional Techniques: Yes
Which Technique?: Default
Location #1: frontal hairline
Amount Of Blood Collected (Optional - Include Units): 60 cc
Location #4: vertex1
Location #3: left temporal
Consent: Written consent obtained, risks reviewed including but not limited to pain, scarring, infection and incomplete improvement.  Patient understands the procedure is cosmetic in nature and will require out of pocket payment.

## 2020-02-10 NOTE — PROCEDURE: OTHER
Note Text (......Xxx Chief Complaint.): This diagnosis correlates with the
Detail Level: Zone
Other (Free Text): Patient ask for PRP and microneedling on face and neck (Vampire Lift) and Dr WILCOX advise her the treatment should be done 3 times, 6 weeks apart, quoted 1500 $ each treatment

## 2020-04-27 ENCOUNTER — APPOINTMENT (RX ONLY)
Dept: URBAN - METROPOLITAN AREA OTHER 9 | Facility: OTHER | Age: 70
Setting detail: DERMATOLOGY
End: 2020-04-27

## 2020-04-27 DIAGNOSIS — L65.9 NONSCARRING HAIR LOSS, UNSPECIFIED: ICD-10-CM

## 2020-04-27 PROCEDURE — ? MICRONEEDLING

## 2020-04-27 PROCEDURE — ? PLATELET RICH PLASMA INJECTION

## 2020-04-27 NOTE — PROCEDURE: PLATELET RICH PLASMA INJECTION
Amount Injected At This Location In Cc (Will Not Render If 0): 0
Amount Of Blood Collected (Optional - Include Units): 60 cc
Amount Injected At This Location In Cc (Will Not Render If 0): 1.5
Detail Level: Zone
Location #1: frontal hairline
Location #4: vertex scalp
Location #3: right temporal scalp
Consent: Written consent obtained, risks reviewed including but not limited to pain, scarring, infection and incomplete improvement.  Patient understands the procedure is cosmetic in nature and will require out of pocket payment.
Location #2: left temporal scalp
Show Additional Techniques: Yes
Amount Injected At This Location In Cc (Will Not Render If 0): 2
Post-Care Instructions: After the procedure, take precautions agains sun exposure. Do not apply sunscreen for 12 hours after the procedure. Do not apply make-up for 12 hours after the procedure. Avoid alcohol based toners for 10-14 days. After 2-3 days patients can return to their regular skin regimen.
Price (Use Numbers Only, No Special Characters Or $): 077
Treatment Number (Optional): 3
Which Technique?: Default

## 2020-04-27 NOTE — PROCEDURE: MICRONEEDLING
Depth In Mm (Location #3): 1.5
Location #1: frontal hairline
Consent: Written consent obtained, risks reviewed including but not limited to pain, scarring, infection and incomplete improvement.  Patient understands the procedure is cosmetic in nature and will require out of pocket payment.
Depth In Mm (Location #5): 0.1
Location #4: vertex scalp
Treatment Number (Optional): 3
Location #3: right temporal scalp
Location #2: left temporal scalp
Post-Care Instructions: After the procedure, take precautions agains sun exposure. Do not apply sunscreen for 12 hours after the procedure. Do not apply make-up for 12 hours after the procedure. Avoid alcohol based toners for 10-14 days. After 2-3 days patients can return to their regular skin regimen.
Detail Level: Zone

## 2020-05-19 ENCOUNTER — APPOINTMENT (RX ONLY)
Dept: URBAN - METROPOLITAN AREA OTHER 9 | Facility: OTHER | Age: 70
Setting detail: DERMATOLOGY
End: 2020-05-19

## 2020-05-19 DIAGNOSIS — Z41.9 ENCOUNTER FOR PROCEDURE FOR PURPOSES OTHER THAN REMEDYING HEALTH STATE, UNSPECIFIED: ICD-10-CM

## 2020-05-19 PROCEDURE — ? MICRONEEDLING

## 2020-05-19 ASSESSMENT — LOCATION DETAILED DESCRIPTION DERM
LOCATION DETAILED: LEFT LATERAL BUCCAL CHEEK
LOCATION DETAILED: RIGHT INFERIOR MEDIAL MALAR CHEEK
LOCATION DETAILED: RIGHT UPPER CUTANEOUS LIP
LOCATION DETAILED: LEFT SUPERIOR LATERAL BUCCAL CHEEK
LOCATION DETAILED: RIGHT INFERIOR TEMPLE
LOCATION DETAILED: RIGHT CENTRAL LATERAL NECK
LOCATION DETAILED: RIGHT INFERIOR LATERAL NECK
LOCATION DETAILED: LEFT SUPERIOR ANTERIOR NECK
LOCATION DETAILED: LEFT CENTRAL BUCCAL CHEEK
LOCATION DETAILED: SUBMENTAL CHIN
LOCATION DETAILED: LEFT SUPERIOR LATERAL MALAR CHEEK
LOCATION DETAILED: LEFT UPPER CUTANEOUS LIP
LOCATION DETAILED: RIGHT SUPERIOR ANTERIOR NECK
LOCATION DETAILED: RIGHT LATERAL BUCCAL CHEEK
LOCATION DETAILED: LEFT CENTRAL LATERAL NECK
LOCATION DETAILED: LEFT INFERIOR TEMPLE
LOCATION DETAILED: LEFT MEDIAL FOREHEAD
LOCATION DETAILED: RIGHT SUBMANDIBULAR AREA
LOCATION DETAILED: RIGHT MID TEMPLE
LOCATION DETAILED: LEFT INFERIOR LATERAL NECK
LOCATION DETAILED: RIGHT CENTRAL MALAR CHEEK
LOCATION DETAILED: LEFT LOWER CUTANEOUS LIP
LOCATION DETAILED: RIGHT SUPERIOR LATERAL MALAR CHEEK
LOCATION DETAILED: RIGHT SUPERIOR LATERAL NECK
LOCATION DETAILED: LEFT SUPERIOR LATERAL NECK
LOCATION DETAILED: RIGHT LATERAL FOREHEAD
LOCATION DETAILED: LEFT INFERIOR MEDIAL MALAR CHEEK
LOCATION DETAILED: RIGHT CHIN
LOCATION DETAILED: LEFT MID TEMPLE
LOCATION DETAILED: RIGHT INFERIOR PREAURICULAR CHEEK
LOCATION DETAILED: RIGHT LOWER CUTANEOUS LIP
LOCATION DETAILED: RIGHT CENTRAL BUCCAL CHEEK

## 2020-05-19 ASSESSMENT — LOCATION SIMPLE DESCRIPTION DERM
LOCATION SIMPLE: RIGHT TEMPLE
LOCATION SIMPLE: LEFT FOREHEAD
LOCATION SIMPLE: RIGHT ANTERIOR NECK
LOCATION SIMPLE: RIGHT SUBMANDIBULAR AREA
LOCATION SIMPLE: RIGHT FOREHEAD
LOCATION SIMPLE: LEFT ANTERIOR NECK
LOCATION SIMPLE: SUBMENTAL CHIN
LOCATION SIMPLE: LEFT CHEEK
LOCATION SIMPLE: RIGHT LIP
LOCATION SIMPLE: RIGHT CHEEK
LOCATION SIMPLE: LEFT TEMPLE
LOCATION SIMPLE: LEFT LIP
LOCATION SIMPLE: NECK
LOCATION SIMPLE: CHIN

## 2020-05-19 ASSESSMENT — LOCATION ZONE DERM
LOCATION ZONE: LIP
LOCATION ZONE: FACE
LOCATION ZONE: NECK

## 2020-05-19 NOTE — PROCEDURE: MICRONEEDLING
Location #1: forehead
Depth In Mm (Location #3): 0.5
Length Of Topical Anesthesia Application (Optional): 30 minutes
Location #4: mouth
Consent: Written consent obtained, risks reviewed including but not limited to pain, scarring, infection and incomplete improvement.  Patient understands the procedure is cosmetic in nature and will require out of pocket payment.
Topical Anesthesia?: BLT cream (benzocaine 20%, lidocaine 6%, tetracaine 4%)
Location #5: left and right nasolabial fold
Detail Level: Zone
Location #2: right and left cheek
Price (Use Numbers Only, No Special Characters Or $): 1500
Post-Care Instructions: After the procedure, take precautions agains sun exposure. Do not apply sunscreen for 12 hours after the procedure. Do not apply make-up for 12 hours after the procedure. Avoid alcohol based toners for 10-14 days. After 2-3 days patients can return to their regular skin regimen.
Location #3: neck
Depth In Mm (Location #5): 0.6
Treatment Number (Optional): 1

## 2020-06-02 ENCOUNTER — APPOINTMENT (RX ONLY)
Dept: URBAN - METROPOLITAN AREA OTHER 9 | Facility: OTHER | Age: 70
Setting detail: DERMATOLOGY
End: 2020-06-02

## 2020-06-02 DIAGNOSIS — L65.9 NONSCARRING HAIR LOSS, UNSPECIFIED: ICD-10-CM

## 2020-06-02 PROCEDURE — ? PLATELET RICH PLASMA INJECTION

## 2020-06-02 PROCEDURE — ? MICRONEEDLING

## 2020-06-02 NOTE — PROCEDURE: MICRONEEDLING
Consent: Written consent obtained, risks reviewed including but not limited to pain, scarring, infection and incomplete improvement.  Patient understands the procedure is cosmetic in nature and will require out of pocket payment.
Detail Level: Zone
Location #1: frontal hairline
Depth In Mm (Location #2): 1.5
Location #3: right temple
Treatment Number (Optional): 4
Location #2: left temple
Post-Care Instructions: After the procedure, take precautions agains sun exposure. Do not apply sunscreen for 12 hours after the procedure. Do not apply make-up for 12 hours after the procedure. Avoid alcohol based toners for 10-14 days. After 2-3 days patients can return to their regular skin regimen.
Depth In Mm (Location #5): 0.1
Location #4: crown scalp

## 2020-06-02 NOTE — PROCEDURE: PLATELET RICH PLASMA INJECTION
Depth In Mm (Will Not Render If 0): 0
Which Technique?: Default
Location #3: right temple
Amount Injected At This Location In Cc (Will Not Render If 0): 2
Price (Use Numbers Only, No Special Characters Or $): 323
Post-Care Instructions: After the procedure, take precautions agains sun exposure. Do not apply sunscreen for 12 hours after the procedure. Do not apply make-up for 12 hours after the procedure. Avoid alcohol based toners for 10-14 days. After 2-3 days patients can return to their regular skin regimen.
Location #4: crown scalp
Amount Injected At This Location In Cc (Will Not Render If 0): 1.5
Amount Of Blood Processed (Optional - Include Units): 60 cc
Consent: Written consent obtained, risks reviewed including but not limited to pain, scarring, infection and incomplete improvement.  Patient understands the procedure is cosmetic in nature and will require out of pocket payment.
Show Additional Techniques: Yes
Detail Level: Zone
Location #1: frontal hairline
Amount Injected At This Location In Cc (Will Not Render If 0): 2.5
Location #2: left temple
Treatment Number (Optional): 4

## 2020-06-26 ENCOUNTER — APPOINTMENT (RX ONLY)
Dept: URBAN - METROPOLITAN AREA OTHER 9 | Facility: OTHER | Age: 70
Setting detail: DERMATOLOGY
End: 2020-06-26

## 2020-06-26 DIAGNOSIS — Z41.9 ENCOUNTER FOR PROCEDURE FOR PURPOSES OTHER THAN REMEDYING HEALTH STATE, UNSPECIFIED: ICD-10-CM

## 2020-06-26 PROCEDURE — ? OTHER

## 2020-06-26 PROCEDURE — ? MICRONEEDLING

## 2020-06-26 NOTE — PROCEDURE: OTHER
Note Text (......Xxx Chief Complaint.): This diagnosis correlates with the
Other (Free Text): PRP was injected in naso labial folds, marionette folds,  tears troughs ,neck and chest
Detail Level: Zone

## 2020-06-26 NOTE — PROCEDURE: MICRONEEDLING
Post-Care Instructions: After the procedure, take precautions agains sun exposure. Do not apply sunscreen for 12 hours after the procedure. Do not apply make-up for 12 hours after the procedure. Avoid alcohol based toners for 10-14 days. After 2-3 days patients can return to their regular skin regimen.
Location #3: right cheek
Depth In Mm (Location #2): 0.5
Treatment Number (Optional): 0
Location #1: forehead
Location #4: chin
Consent: Written consent obtained, risks reviewed including but not limited to pain, scarring, infection and incomplete improvement.  Patient understands the procedure is cosmetic in nature and will require out of pocket payment.
Length Of Topical Anesthesia Application (Optional): 30 minutes
Topical Anesthesia?: BLT cream (benzocaine 20%, lidocaine 6%, tetracaine 6%)
Location #5: neck and chest
Location #2: left cheek
Detail Level: Zone
Price (Use Numbers Only, No Special Characters Or $): 2000
Depth In Mm (Location #4): 0.6

## 2020-07-31 ENCOUNTER — APPOINTMENT (RX ONLY)
Dept: URBAN - METROPOLITAN AREA OTHER 9 | Facility: OTHER | Age: 70
Setting detail: DERMATOLOGY
End: 2020-07-31

## 2020-07-31 DIAGNOSIS — Z41.9 ENCOUNTER FOR PROCEDURE FOR PURPOSES OTHER THAN REMEDYING HEALTH STATE, UNSPECIFIED: ICD-10-CM

## 2020-07-31 PROCEDURE — ? MICRONEEDLING

## 2020-07-31 PROCEDURE — ? PLATELET RICH PLASMA INJECTION

## 2020-07-31 NOTE — PROCEDURE: PLATELET RICH PLASMA INJECTION
Location #2: left cheek
Amount Injected At This Location In Cc (Will Not Render If 0): 0
Consent: Written consent obtained, risks reviewed including but not limited to pain, scarring, infection and incomplete improvement.  Patient understands the procedure is cosmetic in nature and will require out of pocket payment.
Treatment Number (Optional): 3
Amount Injected At This Location In Cc (Will Not Render If 0): 2
Location #1: right cheek
Amount Of Blood Collected (Optional - Include Units): 60cc
Post-Care Instructions: After the procedure, take precautions agains sun exposure. Do not apply sunscreen for 12 hours after the procedure. Do not apply make-up for 12 hours after the procedure. Avoid alcohol based toners for 10-14 days. After 2-3 days patients can return to their regular skin regimen.
Amount Injected At This Location In Cc (Will Not Render If 0): 1
Location #5: chest
Show Additional Techniques: Yes
Location #4: neck
Location #3: marionettes lines
Price (Use Numbers Only, No Special Characters Or $): 0267
Which Technique?: Default
Detail Level: Zone

## 2020-07-31 NOTE — PROCEDURE: MICRONEEDLING
Depth In Mm (Location #2): 0.5
Depth In Mm (Location #5): 0.7
Location #3: left cheek
Location #4: neck
Location #1: forehead
Consent: Written consent obtained, risks reviewed including but not limited to pain, scarring, infection and incomplete improvement.  Patient understands the procedure is cosmetic in nature and will require out of pocket payment.
Detail Level: Zone
Location #2: right cheek
Post-Care Instructions: After the procedure, take precautions agains sun exposure. Do not apply sunscreen for 12 hours after the procedure. Do not apply make-up for 12 hours after the procedure. Avoid alcohol based toners for 10-14 days. After 2-3 days patients can return to their regular skin regimen.
Location #5: chest
Treatment Number (Optional): 3

## 2020-09-09 ENCOUNTER — APPOINTMENT (RX ONLY)
Dept: URBAN - METROPOLITAN AREA CLINIC 12 | Facility: CLINIC | Age: 70
Setting detail: DERMATOLOGY
End: 2020-09-09

## 2020-09-09 DIAGNOSIS — Z41.9 ENCOUNTER FOR PROCEDURE FOR PURPOSES OTHER THAN REMEDYING HEALTH STATE, UNSPECIFIED: ICD-10-CM

## 2020-09-09 PROCEDURE — ? BOTOX

## 2020-09-09 PROCEDURE — ? FILLERS

## 2020-09-09 PROCEDURE — ? BELOTERO INJECTION

## 2020-09-09 NOTE — PROCEDURE: BELOTERO INJECTION
Mid Face Filler Volume In Cc: 0
Number Of Syringes (Required For Inventory): 1
Procedural Text: The filler was administered to the treatment areas noted above.
Filler: Belotero
Price (Use Numbers Only, No Special Characters Or $): 90
Expiration Date (Month Year): 04/2021
Map Statement: See Attach Map for Complete Details
Additional Area 3 Location: eyelid-cheek junction
Additional Area 1 Location: perioral lines
Include Cannula Information In Note?: No
Topical Anesthesia?: 23% lidocaine, 7% tetracaine
Additional Area 1 Volume In Cc: 0.1
Consent: Written consent obtained. Risks include but not limited to bruising, beading, irregular texture, ulceration, infection, allergic reaction, scar formation, incomplete augmentation, temporary nature, procedural pain.
Post-Care Instructions: Patient instructed to apply ice to reduce swelling.
Detail Level: Detailed
Lot #: [id number]
Additional Area 2 Location: periorbital rim

## 2020-09-09 NOTE — HPI: COSMETIC (FILLERS)
Azithromycin Pregnancy And Lactation Text: This medication is considered safe during pregnancy and is also secreted in breast milk. Have You Had Fillers Before?: has had fillers

## 2020-09-09 NOTE — PROCEDURE: BOTOX
Additional Area 3 Location: lateral platysma bands
Dilution (U/0.1 Cc): 5
Additional Area 3 Units: 0
Lot #: [id number]
Detail Level: Simple
Forehead Units: 7.5
Use Map Statement For Sites (Optional): Yes
Expiration Date (Month Year): 04/2023
Map Statment: See attached map for complete details
Postcare Instructions: Patient instructed to not lie down for 4 hours and limit physical activity for 24 hours. Patient instructed not to travel by airplane for 48 hours.
Bill Summary Price Listed Below, Or Bill Total Of Units X Price Per Unit?: Bill #Units x Price Per Unit
Topical Anesthesia?: 23% lidocaine, 7% tetracaine
Price Per Unit In $ (Use Numbers Only, No Text Please.): 17
Additional Area 2 Location: Chin
Consent: Written consent was obtained prior to the procedure. Risks, benefits, expectations and alternatives were discussed including, but not limited to, infection, bleeding, lid/brow ptosis, bruising, swelling, diplopia, temporary effects, incomplete chemical denervation and dissatisfaction with the cosmetic outcome. No guarantee or warranty was given or implied regarding longevity of results.
Administered By (Optional): Theresa Vasquez
Additional Area 1 Location: cheek attachment with striations

## 2020-09-09 NOTE — PROCEDURE: FILLERS
Mental Crease Filler Volume In Cc: 0
Show Price In Note?: yes
Topical Anesthetic #1: lidocaine
Consent: Written consent obtained. Risks include but not limited to bruising, beading, irregular texture, ulceration, infection, allergic reaction, scar formation, incomplete augmentation, temporary nature, procedural pain.
Marionette Lines Filler Volume In Cc: 0.8
Additional Area 4 Location: cheek depressions
Additional Area 1 Location: glabellar lines
Additional Area 5 Location: forehead lines
Additional Area 1 Location: perioral lines
Topical Anesthetic Base: ointment
Topical % #2: 7
Additional Area 5 Location: oral commissures
Lot #: [id number]
Detail Level: Simple
Additional Area 2 Location: glabellar lines and forehead lines
Topical Anesthetic #2: tetracaine
Map Statment: See attached map for complete details
Additional Area 3 Location: smile line
Expiration Date (Month Year): 10/2022
Administered By (Optional): Theresa Vasquez RN
Topical % #1: 23
Upper Lip Filler Volume In Cc: 0.1
Price $ (Numeric Only, No Text Please.): 639
Filler: Restylane-L
Additional Area 4 Location: pore

## 2020-09-22 ENCOUNTER — APPOINTMENT (RX ONLY)
Dept: URBAN - METROPOLITAN AREA CLINIC 12 | Facility: CLINIC | Age: 70
Setting detail: DERMATOLOGY
End: 2020-09-22

## 2020-09-22 DIAGNOSIS — Z42.8 ENCOUNTER FOR OTHER PLASTIC AND RECONSTRUCTIVE SURGERY FOLLOWING MEDICAL PROCEDURE OR HEALED INJURY: ICD-10-CM

## 2020-09-22 PROCEDURE — ? BOTOX

## 2020-09-22 NOTE — PROCEDURE: BOTOX
Additional Area 1 Units: 0
Detail Level: Simple
Use Map Statement For Sites (Optional): Yes
Forehead Units: 2.5
Price Per Unit In $ (Use Numbers Only, No Text Please.): 14
Expiration Date (Month Year): 05/2023
Dilution (U/0.1 Cc): 5
Bill Summary Price Listed Below, Or Bill Total Of Units X Price Per Unit?: Bill #Units x Price Per Unit
Additional Area 3 Location: lateral platysma bands
Additional Area 2 Location: Chin
Consent: Written consent was obtained prior to the procedure. Risks, benefits, expectations and alternatives were discussed including, but not limited to, infection, bleeding, lid/brow ptosis, bruising, swelling, diplopia, temporary effects, incomplete chemical denervation and dissatisfaction with the cosmetic outcome. No guarantee or warranty was given or implied regarding longevity of results.
Postcare Instructions: Patient instructed to not lie down for 4 hours and limit physical activity for 24 hours. Patient instructed not to travel by airplane for 48 hours.
Lot #: [id number]
Additional Area 1 Location: cheek attachment with striations
Administered By (Optional): Theresa Vasquez
Map Statment: See attached map for complete details

## 2020-11-16 ENCOUNTER — RX ONLY (OUTPATIENT)
Age: 70
Setting detail: RX ONLY
End: 2020-11-16

## 2020-11-16 RX ORDER — MINOXIDIL 10 MG/1
TABLET ORAL
Qty: 90 | Refills: 3 | Status: ERX

## 2020-12-09 ENCOUNTER — APPOINTMENT (RX ONLY)
Dept: URBAN - METROPOLITAN AREA CLINIC 12 | Facility: CLINIC | Age: 70
Setting detail: DERMATOLOGY
End: 2020-12-09

## 2020-12-09 DIAGNOSIS — Z41.9 ENCOUNTER FOR PROCEDURE FOR PURPOSES OTHER THAN REMEDYING HEALTH STATE, UNSPECIFIED: ICD-10-CM

## 2020-12-09 PROCEDURE — ? FILLERS

## 2020-12-09 NOTE — PROCEDURE: FILLERS
Mental Crease Filler Volume In Cc: 0
Administered By (Optional): Theresa Vasquez RN
Additional Area 5 Location: forehead wrinkle
Additional Area 3 Location: chin
Additional Area 1 Location: forehead lines
Additional Area 1 Location: perioral lines
Topical Anesthesia?: yes
Topical % #1: 23
Topical Anesthetic #1: lidocaine
Additional Area 2 Location: glabellar lines
Additional Area 2 Location: glabellar lines and forehead lines
Lot #: [id number]
Additional Area 4 Location: bilateral earlobes
Additional Area 3 Location: oral commissures
Expiration Date (Month Year): 02/2023
Consent: Written consent obtained. Risks include but not limited to bruising, beading, irregular texture, ulceration, infection, allergic reaction, scar formation, incomplete augmentation, temporary nature, procedural pain.
Topical Anesthetic Base: ointment
Topical % #2: 7
Topical Anesthetic #2: tetracaine
Additional Area 1 Location: perioral shadows
Filler: Teosyal RHA 3
Price $ (Numeric Only, No Text Please.): 942
Additional Area 4 Location: scar on nose
Detail Level: Simple
Map Statment: See attached map for complete details

## 2021-01-11 ENCOUNTER — APPOINTMENT (RX ONLY)
Dept: URBAN - METROPOLITAN AREA OTHER 9 | Facility: OTHER | Age: 71
Setting detail: DERMATOLOGY
End: 2021-01-11

## 2021-01-11 DIAGNOSIS — D22 MELANOCYTIC NEVI: ICD-10-CM

## 2021-01-11 DIAGNOSIS — L84 CORNS AND CALLOSITIES: ICD-10-CM

## 2021-01-11 DIAGNOSIS — L65.9 NONSCARRING HAIR LOSS, UNSPECIFIED: ICD-10-CM

## 2021-01-11 DIAGNOSIS — L82.1 OTHER SEBORRHEIC KERATOSIS: ICD-10-CM

## 2021-01-11 DIAGNOSIS — L57.8 OTHER SKIN CHANGES DUE TO CHRONIC EXPOSURE TO NONIONIZING RADIATION: ICD-10-CM

## 2021-01-11 PROBLEM — D22.5 MELANOCYTIC NEVI OF TRUNK: Status: ACTIVE | Noted: 2021-01-11

## 2021-01-11 PROCEDURE — ? OTHER

## 2021-01-11 PROCEDURE — ? COUNSELING

## 2021-01-11 PROCEDURE — 99213 OFFICE O/P EST LOW 20 MIN: CPT

## 2021-01-11 PROCEDURE — ? BENIGN DESTRUCTION COSMETIC

## 2021-01-11 ASSESSMENT — LOCATION ZONE DERM
LOCATION ZONE: LEG
LOCATION ZONE: SCALP
LOCATION ZONE: FEET
LOCATION ZONE: NECK
LOCATION ZONE: ARM
LOCATION ZONE: TRUNK
LOCATION ZONE: FACE

## 2021-01-11 ASSESSMENT — LOCATION DETAILED DESCRIPTION DERM
LOCATION DETAILED: RIGHT INFERIOR LATERAL NECK
LOCATION DETAILED: UPPER STERNUM
LOCATION DETAILED: MID-OCCIPITAL SCALP
LOCATION DETAILED: LEFT ANTERIOR PROXIMAL THIGH
LOCATION DETAILED: LEFT SUPERIOR UPPER BACK
LOCATION DETAILED: RIGHT CENTRAL EYEBROW
LOCATION DETAILED: LEFT CLAVICULAR SKIN
LOCATION DETAILED: LEFT SUPERIOR FOREHEAD
LOCATION DETAILED: RIGHT POSTERIOR SHOULDER
LOCATION DETAILED: LEFT PLANTAR FOREFOOT OVERLYING 3RD METATARSAL
LOCATION DETAILED: RIGHT CLAVICULAR NECK
LOCATION DETAILED: LEFT FOREHEAD
LOCATION DETAILED: INFERIOR THORACIC SPINE
LOCATION DETAILED: LEFT INFERIOR LATERAL NECK

## 2021-01-11 ASSESSMENT — LOCATION SIMPLE DESCRIPTION DERM
LOCATION SIMPLE: UPPER BACK
LOCATION SIMPLE: LEFT UPPER BACK
LOCATION SIMPLE: LEFT CLAVICULAR SKIN
LOCATION SIMPLE: LEFT ANTERIOR NECK
LOCATION SIMPLE: RIGHT ANTERIOR NECK
LOCATION SIMPLE: RIGHT SHOULDER
LOCATION SIMPLE: LEFT FOREHEAD
LOCATION SIMPLE: CHEST
LOCATION SIMPLE: POSTERIOR SCALP
LOCATION SIMPLE: LEFT THIGH
LOCATION SIMPLE: LEFT PLANTAR SURFACE
LOCATION SIMPLE: RIGHT EYEBROW

## 2021-01-11 NOTE — PROCEDURE: OTHER
Render Risk Assessment In Note?: no
Note Text (......Xxx Chief Complaint.): This diagnosis correlates with the
Detail Level: Zone
Other (Free Text): Recommended patient sleeps on one side. PRP on top and back of scalp at same time.\\nDr. P quoted $800 for front and back PRP.

## 2021-01-15 ENCOUNTER — APPOINTMENT (RX ONLY)
Dept: URBAN - METROPOLITAN AREA OTHER 9 | Facility: OTHER | Age: 71
Setting detail: DERMATOLOGY
End: 2021-01-15

## 2021-01-15 DIAGNOSIS — L65.9 NONSCARRING HAIR LOSS, UNSPECIFIED: ICD-10-CM

## 2021-01-15 PROCEDURE — ? PLATELET RICH PLASMA INJECTION

## 2021-01-15 PROCEDURE — ? MICRONEEDLING

## 2021-01-15 NOTE — PROCEDURE: PLATELET RICH PLASMA INJECTION
Amount Injected At This Location In Cc (Will Not Render If 0): 0
Consent: Written consent obtained, risks reviewed including but not limited to pain, scarring, infection and incomplete improvement.  Patient understands the procedure is cosmetic in nature and will require out of pocket payment.
Amount Of Blood Collected (Optional - Include Units): 60cc
Post-Care Instructions: After the procedure, take precautions agains sun exposure. Do not apply sunscreen for 12 hours after the procedure. Do not apply make-up for 12 hours after the procedure. Avoid alcohol based toners for 10-14 days. After 2-3 days patients can return to their regular skin regimen.
Which Technique?: Default
Show Additional Techniques: Yes
Price (Use Numbers Only, No Special Characters Or $): 474
Detail Level: Zone
Treatment Number (Optional): 5

## 2021-01-15 NOTE — PROCEDURE: MICRONEEDLING
Depth In Mm (Location #4): 0.1
Consent: Written consent obtained, risks reviewed including but not limited to pain, scarring, infection and incomplete improvement.  Patient understands the procedure is cosmetic in nature and will require out of pocket payment.
Treatment Number (Optional): 5
Detail Level: Zone
Post-Care Instructions: After the procedure, take precautions agains sun exposure. Do not apply sunscreen for 12 hours after the procedure. Do not apply make-up for 12 hours after the procedure. Avoid alcohol based toners for 10-14 days. After 2-3 days patients can return to their regular skin regimen.

## 2021-01-29 ENCOUNTER — APPOINTMENT (RX ONLY)
Dept: URBAN - METROPOLITAN AREA OTHER 9 | Facility: OTHER | Age: 71
Setting detail: DERMATOLOGY
End: 2021-01-29

## 2021-01-29 DIAGNOSIS — Z41.9 ENCOUNTER FOR PROCEDURE FOR PURPOSES OTHER THAN REMEDYING HEALTH STATE, UNSPECIFIED: ICD-10-CM

## 2021-01-29 PROCEDURE — ? PLATELET RICH PLASMA INJECTION

## 2021-01-29 PROCEDURE — ? MICRONEEDLING

## 2021-01-29 NOTE — PROCEDURE: PLATELET RICH PLASMA INJECTION
Location #2: left cheek
Amount Injected At This Location In Cc (Will Not Render If 0): 0
Consent: Written consent obtained, risks reviewed including but not limited to pain, scarring, infection and incomplete improvement.  Patient understands the procedure is cosmetic in nature and will require out of pocket payment.
Treatment Number (Optional): 4
Amount Injected At This Location In Cc (Will Not Render If 0): 2
Location #1: right cheek
Amount Of Blood Collected (Optional - Include Units): 60cc
Post-Care Instructions: After the procedure, take precautions agains sun exposure. Do not apply sunscreen for 12 hours after the procedure. Do not apply make-up for 12 hours after the procedure. Avoid alcohol based toners for 10-14 days. After 2-3 days patients can return to their regular skin regimen.
Amount Injected At This Location In Cc (Will Not Render If 0): 1
Location #5: chest
Show Additional Techniques: Yes
Location #4: neck
Location #3: marionettes lines
Price (Use Numbers Only, No Special Characters Or $): 6899
Which Technique?: Default
Detail Level: Zone

## 2021-01-29 NOTE — PROCEDURE: MICRONEEDLING
Depth In Mm (Location #2): 0.5
Depth In Mm (Location #5): 0.7
Location #3: left cheek
Location #4: neck
Location #1: forehead
Consent: Written consent obtained, risks reviewed including but not limited to pain, scarring, infection and incomplete improvement.  Patient understands the procedure is cosmetic in nature and will require out of pocket payment.
Detail Level: Zone
Location #2: right cheek
Post-Care Instructions: After the procedure, take precautions agains sun exposure. Do not apply sunscreen for 12 hours after the procedure. Do not apply make-up for 12 hours after the procedure. Avoid alcohol based toners for 10-14 days. After 2-3 days patients can return to their regular skin regimen.
Location #5: chest
Treatment Number (Optional): 4

## 2021-02-16 ENCOUNTER — APPOINTMENT (RX ONLY)
Dept: URBAN - METROPOLITAN AREA CLINIC 12 | Facility: CLINIC | Age: 71
Setting detail: DERMATOLOGY
End: 2021-02-16

## 2021-02-16 DIAGNOSIS — Z41.9 ENCOUNTER FOR PROCEDURE FOR PURPOSES OTHER THAN REMEDYING HEALTH STATE, UNSPECIFIED: ICD-10-CM

## 2021-02-16 PROCEDURE — ? RESTYLANE DEFYNE INJECTION

## 2021-02-16 NOTE — PROCEDURE: RESTYLANE DEFYNE INJECTION
Additional Area 5 Volume In Cc: 0
Post-Care Instructions: Patient instructed to apply ice to reduce swelling.
Topical Anesthesia?: 23% lidocaine, 7% tetracaine
Additional Area 2 Location: lateral face
Filler: Restylane Defyne
Price (Use Numbers Only, No Special Characters Or $): 093
Include Cannula Length?: 1.5 inch
Map Statement: See Attach Map for Complete Details
Lot #: [id number]
Use Map Statement For Sites (Optional): No
Procedural Text: The filler was administered to the treatment areas noted above.
Detail Level: Detailed
Additional Area 1 Location: periorbital rim
Number Of Syringes (Required For Inventory): 1
Include Cannula Size?: 25G
Consent: Written consent obtained. Risks include but not limited to bruising, beading, irregular texture, ulceration, infection, allergic reaction, scar formation, incomplete augmentation, temporary nature, procedural pain.
Expiration Date (Month Year): 08/2022

## 2021-03-09 ENCOUNTER — APPOINTMENT (RX ONLY)
Dept: URBAN - METROPOLITAN AREA OTHER 9 | Facility: OTHER | Age: 71
Setting detail: DERMATOLOGY
End: 2021-03-09

## 2021-03-09 DIAGNOSIS — L65.9 NONSCARRING HAIR LOSS, UNSPECIFIED: ICD-10-CM

## 2021-03-09 DIAGNOSIS — L738 OTHER SPECIFIED DISEASES OF HAIR AND HAIR FOLLICLES: ICD-10-CM

## 2021-03-09 DIAGNOSIS — Z41.9 ENCOUNTER FOR PROCEDURE FOR PURPOSES OTHER THAN REMEDYING HEALTH STATE, UNSPECIFIED: ICD-10-CM | Status: IMPROVED

## 2021-03-09 DIAGNOSIS — L663 OTHER SPECIFIED DISEASES OF HAIR AND HAIR FOLLICLES: ICD-10-CM

## 2021-03-09 DIAGNOSIS — L73.9 FOLLICULAR DISORDER, UNSPECIFIED: ICD-10-CM

## 2021-03-09 PROBLEM — L02.12 FURUNCLE OF NECK: Status: ACTIVE | Noted: 2021-03-09

## 2021-03-09 PROCEDURE — ? PATIENT SPECIFIC COUNSELING

## 2021-03-09 PROCEDURE — ? OTHER

## 2021-03-09 PROCEDURE — ? COUNSELING

## 2021-03-09 PROCEDURE — 99212 OFFICE O/P EST SF 10 MIN: CPT

## 2021-03-09 ASSESSMENT — LOCATION ZONE DERM
LOCATION ZONE: NECK
LOCATION ZONE: SCALP

## 2021-03-09 ASSESSMENT — LOCATION DETAILED DESCRIPTION DERM
LOCATION DETAILED: MID POSTERIOR NECK
LOCATION DETAILED: MID-FRONTAL SCALP

## 2021-03-09 ASSESSMENT — LOCATION SIMPLE DESCRIPTION DERM
LOCATION SIMPLE: POSTERIOR NECK
LOCATION SIMPLE: ANTERIOR SCALP

## 2021-03-09 NOTE — PROCEDURE: PATIENT SPECIFIC COUNSELING
Dr. Lombardo explained that what Theresa Vasquez does would not affect the microneedling. Pt wants to get vampire lift done every 6 months. Pt expressed understanding that microneedling didn’t cause any issues with the filler bc no heat was used and they didn’t go into major depth.
Detail Level: Simple

## 2021-03-09 NOTE — PROCEDURE: OTHER
Other (Free Text): Advised pt to use alcohol wipes after working out.
Note Text (......Xxx Chief Complaint.): This diagnosis correlates with the
Render Risk Assessment In Note?: no
Detail Level: Zone

## 2021-04-20 ENCOUNTER — APPOINTMENT (RX ONLY)
Dept: URBAN - METROPOLITAN AREA CLINIC 12 | Facility: CLINIC | Age: 71
Setting detail: DERMATOLOGY
End: 2021-04-20

## 2021-04-20 DIAGNOSIS — Z41.9 ENCOUNTER FOR PROCEDURE FOR PURPOSES OTHER THAN REMEDYING HEALTH STATE, UNSPECIFIED: ICD-10-CM

## 2021-04-20 PROCEDURE — ? BOTOX

## 2021-04-20 PROCEDURE — ? TEOSYAL RHA 2 INJECTION

## 2021-04-20 NOTE — PROCEDURE: BOTOX
Price Per Unit In $ (Use Numbers Only, No Text Please.): 17
Tim Units: 0
Postcare Instructions: Patient instructed to not lie down for 4 hours and limit physical activity for 24 hours. Patient instructed not to travel by airplane for 48 hours.
Map Statment: See attached map for complete details
Bill Summary Price Listed Below, Or Bill Total Of Units X Price Per Unit?: Bill #Units x Price Per Unit
Detail Level: Simple
Use Map Statement For Sites (Optional): Yes
Forehead Units: 5
Glabellar Complex Units: 10
Lot #: [id number]
Additional Area 3 Location: masseter muscles
Expiration Date (Month Year): 07/2023
Additional Area 2 Location: Chin
Consent: Written consent was obtained prior to the procedure. Risks, benefits, expectations and alternatives were discussed including, but not limited to, infection, bleeding, lid/brow ptosis, bruising, swelling, diplopia, temporary effects, incomplete chemical denervation and dissatisfaction with the cosmetic outcome. No guarantee or warranty was given or implied regarding longevity of results.
Dilution (U/0.1 Cc): 2.2
Administered By (Optional): Theresa Vasquez

## 2021-04-20 NOTE — PROCEDURE: TEOSYAL RHA 2 INJECTION
Detail Level: Detailed
Nasolabial Folds Filler Volume In Cc: 0.2
Use Map Statement For Sites (Optional): No
Additional Area 2 Volume In Cc: 0
Consent: Written consent obtained. Risks include but not limited to bruising, beading, irregular texture, ulceration, infection, allergic reaction, scar formation, incomplete augmentation, temporary nature, procedural pain.
Filler: Teosyal RHA 2
Jawline Filler Volume In Cc: 0.3
Lot #: [id number]
Procedural Text: The filler was administered to the treatment areas noted above.
Map Statment: See Attach Map for Complete Details
Price (Use Numbers Only, No Special Characters Or $): 519
Marionette Lines Filler Volume In Cc: 0.4
Additional Area 1 Volume In Cc: 0.1
Topical Anesthesia?: 23% lidocaine, 7% tetracaine
Number Of Syringes (Required For Inventory): 1
Additional Area 1 Location: static glabellar lines
Post-Care Instructions: Patient instructed to apply ice to reduce swelling.
Expiration Date (Month Year): 06/2023

## 2021-05-24 ENCOUNTER — APPOINTMENT (RX ONLY)
Dept: URBAN - METROPOLITAN AREA OTHER 9 | Facility: OTHER | Age: 71
Setting detail: DERMATOLOGY
End: 2021-05-24

## 2021-05-24 DIAGNOSIS — L65.9 NONSCARRING HAIR LOSS, UNSPECIFIED: ICD-10-CM

## 2021-05-24 PROCEDURE — ? MICRONEEDLING

## 2021-05-24 PROCEDURE — ? PLATELET RICH PLASMA INJECTION

## 2021-05-24 NOTE — PROCEDURE: PLATELET RICH PLASMA INJECTION
Amount Injected At This Location In Cc (Will Not Render If 0): 2
Amount Injected At This Location In Cc (Will Not Render If 0): 0
Consent: Written consent obtained, risks reviewed including but not limited to pain, scarring, infection and incomplete improvement.  Patient understands the procedure is cosmetic in nature and will require out of pocket payment.
Depth In Mm (Will Not Render If 0): 1.5
Location #1: frontal hairline
Amount Of Blood Collected (Optional - Include Units): 60cc
Post-Care Instructions: After the procedure, take precautions agains sun exposure. Do not apply sunscreen for 12 hours after the procedure. Do not apply make-up for 12 hours after the procedure. Avoid alcohol based toners for 10-14 days. After 2-3 days patients can return to their regular skin regimen.
Location #3: right temporal
Which Technique?: Default
Location #2: left temporal
Show Additional Techniques: Yes
Price (Use Numbers Only, No Special Characters Or $): 086
Detail Level: Zone
Location #4: vertex scalp
Treatment Number (Optional): 6

## 2021-07-19 ENCOUNTER — APPOINTMENT (RX ONLY)
Dept: URBAN - METROPOLITAN AREA OTHER 9 | Facility: OTHER | Age: 71
Setting detail: DERMATOLOGY
End: 2021-07-19

## 2021-07-19 DIAGNOSIS — Z41.9 ENCOUNTER FOR PROCEDURE FOR PURPOSES OTHER THAN REMEDYING HEALTH STATE, UNSPECIFIED: ICD-10-CM

## 2021-07-19 DIAGNOSIS — L65.9 NONSCARRING HAIR LOSS, UNSPECIFIED: ICD-10-CM

## 2021-07-19 DIAGNOSIS — D18.0 HEMANGIOMA: ICD-10-CM

## 2021-07-19 PROBLEM — D18.01 HEMANGIOMA OF SKIN AND SUBCUTANEOUS TISSUE: Status: ACTIVE | Noted: 2021-07-19

## 2021-07-19 PROCEDURE — 17110 DESTRUCTION B9 LES UP TO 14: CPT

## 2021-07-19 PROCEDURE — ? BENIGN DESTRUCTION

## 2021-07-19 PROCEDURE — ? PATIENT SPECIFIC COUNSELING

## 2021-07-19 PROCEDURE — ? COUNSELING

## 2021-07-19 PROCEDURE — 99212 OFFICE O/P EST SF 10 MIN: CPT | Mod: 25

## 2021-07-19 ASSESSMENT — LOCATION SIMPLE DESCRIPTION DERM
LOCATION SIMPLE: RIGHT CHEEK
LOCATION SIMPLE: LEFT CHEEK
LOCATION SIMPLE: RIGHT CHEEK
LOCATION SIMPLE: LEFT CHEEK

## 2021-07-19 ASSESSMENT — LOCATION ZONE DERM
LOCATION ZONE: FACE
LOCATION ZONE: FACE

## 2021-07-19 ASSESSMENT — LOCATION DETAILED DESCRIPTION DERM
LOCATION DETAILED: LEFT INFERIOR LATERAL MALAR CHEEK
LOCATION DETAILED: RIGHT INFERIOR CENTRAL MALAR CHEEK
LOCATION DETAILED: LEFT INFERIOR CENTRAL MALAR CHEEK
LOCATION DETAILED: RIGHT SUPERIOR MEDIAL BUCCAL CHEEK
LOCATION DETAILED: RIGHT INFERIOR CENTRAL MALAR CHEEK

## 2021-07-19 NOTE — PROCEDURE: PATIENT SPECIFIC COUNSELING
Detail Level: Simple
Dr WILCOX states that patient looks better after Vampire Ly[employer] treatment and a reinforcement treatment was planned for August 9th

## 2021-07-19 NOTE — PROCEDURE: BENIGN DESTRUCTION
Detail Level: Simple
Render Post-Care Instructions In Note?: yes
Treatment Number (Will Not Render If 0): 0
Post-Care Instructions: I reviewed with the patient in detail post-care instructions. Patient is to wear sunprotection, and avoid picking at any of the treated lesions. Pt may apply Vaseline to crusted or scabbing areas.
Include Z78.9 (Other Specified Conditions Influencing Health Status) As An Associated Diagnosis?: No
Medical Necessity Clause: This procedure was medically necessary because the lesions that were treated were:
Medical Necessity Information: It is in your best interest to select a reason for this procedure from the list below. All of these items fulfill various CMS LCD requirements except the new and changing color options.

## 2021-08-16 ENCOUNTER — APPOINTMENT (RX ONLY)
Dept: URBAN - METROPOLITAN AREA OTHER 9 | Facility: OTHER | Age: 71
Setting detail: DERMATOLOGY
End: 2021-08-16

## 2021-08-16 DIAGNOSIS — Z41.9 ENCOUNTER FOR PROCEDURE FOR PURPOSES OTHER THAN REMEDYING HEALTH STATE, UNSPECIFIED: ICD-10-CM

## 2021-08-16 PROCEDURE — ? MICRONEEDLING

## 2021-08-16 PROCEDURE — ? PLATELET RICH PLASMA INJECTION

## 2021-08-16 NOTE — PROCEDURE: PLATELET RICH PLASMA INJECTION
Location #2: left cheek
Amount Injected At This Location In Cc (Will Not Render If 0): 0
Consent: Written consent obtained, risks reviewed including but not limited to pain, scarring, infection and incomplete improvement.  Patient understands the procedure is cosmetic in nature and will require out of pocket payment.
Treatment Number (Optional): 5
Amount Injected At This Location In Cc (Will Not Render If 0): 2
Location #1: right cheek
Amount Of Blood Collected (Optional - Include Units): 60cc
Post-Care Instructions: After the procedure, take precautions agains sun exposure. Do not apply sunscreen for 12 hours after the procedure. Do not apply make-up for 12 hours after the procedure. Avoid alcohol based toners for 10-14 days. After 2-3 days patients can return to their regular skin regimen.
Amount Injected At This Location In Cc (Will Not Render If 0): 1
Location #5: chest
Show Additional Techniques: Yes
Location #4: neck
Location #3: marionettes lines
Price (Use Numbers Only, No Special Characters Or $): 2000
Which Technique?: Default
Detail Level: Zone

## 2021-08-16 NOTE — PROCEDURE: MICRONEEDLING
Depth In Mm (Location #2): 0.5
Depth In Mm (Location #5): 0.7
Location #3: left cheek
Location #4: neck
Location #1: forehead
Consent: Written consent obtained, risks reviewed including but not limited to pain, scarring, infection and incomplete improvement.  Patient understands the procedure is cosmetic in nature and will require out of pocket payment.
Detail Level: Zone
Location #2: right cheek
Post-Care Instructions: After the procedure, take precautions agains sun exposure. Do not apply sunscreen for 12 hours after the procedure. Do not apply make-up for 12 hours after the procedure. Avoid alcohol based toners for 10-14 days. After 2-3 days patients can return to their regular skin regimen.
Location #5: chest
Treatment Number (Optional): 5

## 2021-09-01 ENCOUNTER — APPOINTMENT (RX ONLY)
Dept: URBAN - METROPOLITAN AREA CLINIC 12 | Facility: CLINIC | Age: 71
Setting detail: DERMATOLOGY
End: 2021-09-01

## 2021-09-01 DIAGNOSIS — Z41.9 ENCOUNTER FOR PROCEDURE FOR PURPOSES OTHER THAN REMEDYING HEALTH STATE, UNSPECIFIED: ICD-10-CM

## 2021-09-01 PROCEDURE — ? JUVEDERM ULTRA PLUS XC INJECTION

## 2021-09-01 PROCEDURE — ? BOTOX

## 2021-09-01 NOTE — PROCEDURE: BOTOX
Additional Area 2 Units: 0
Use Map Statement For Sites (Optional): Yes
Lot #: [id number]
Forehead Units: 7.5
Dilution (U/0.1 Cc): 5
Map Statment: See attached map for complete details
Price Per Unit In $ (Use Numbers Only, No Text Please.): 17
Bill Summary Price Listed Below, Or Bill Total Of Units X Price Per Unit?: Bill #Units x Price Per Unit
Postcare Instructions: Patient instructed to not lie down for 4 hours and limit physical activity for 24 hours. Patient instructed not to travel by airplane for 48 hours.
Additional Area 1 Location: lateral platysmal bands
Detail Level: Simple
Expiration Date (Month Year): 11/2023
Consent: Written consent was obtained prior to the procedure. Risks, benefits, expectations and alternatives were discussed including, but not limited to, infection, bleeding, lid/brow ptosis, bruising, swelling, diplopia, temporary effects, incomplete chemical denervation and dissatisfaction with the cosmetic outcome. No guarantee or warranty was given or implied regarding longevity of results.
Additional Area 3 Location: masseter muscles
Additional Area 2 Location: upper lip
Administered By (Optional): Theresa Vasquez

## 2021-09-01 NOTE — PROCEDURE: JUVEDERM ULTRA PLUS XC INJECTION
[place] Filler Volume In Cc: 0
Expiration Date (Month Year): 07/2022
Number Of Syringes (Required For Inventory): 1
Map Statment: See Attach Map for Complete Details
Price (Use Numbers Only, No Special Characters Or $): 331
Include Cannula Information In Note?: No
Consent: Written consent obtained. Risks include but not limited to bruising, beading, irregular texture, ulceration, infection, allergic reaction, scar formation, incomplete augmentation, temporary nature, procedural pain.
Detail Level: Detailed
Lot #: [id number]
Post-Care Instructions: Patient instructed to apply ice to reduce swelling.
Filler: Juvederm Ultra Plus XC
Procedural Text: The filler was administered to the treatment areas noted above.

## 2021-10-18 ENCOUNTER — APPOINTMENT (RX ONLY)
Dept: URBAN - METROPOLITAN AREA OTHER 9 | Facility: OTHER | Age: 71
Setting detail: DERMATOLOGY
End: 2021-10-18

## 2021-10-18 DIAGNOSIS — L65.9 NONSCARRING HAIR LOSS, UNSPECIFIED: ICD-10-CM

## 2021-10-18 PROCEDURE — ? PLATELET RICH PLASMA INJECTION

## 2021-10-18 PROCEDURE — ? MICRONEEDLING

## 2021-10-18 NOTE — PROCEDURE: MICRONEEDLING
Location #2: left temporal
Depth In Mm (Location #4): 1.5
Consent: Written consent obtained, risks reviewed including but not limited to pain, scarring, infection and incomplete improvement.  Patient understands the procedure is cosmetic in nature and will require out of pocket payment.
Location #1: frontal hairline
Depth In Mm (Location #5): 0.1
Location #3: right temporal
Location #4: vertex scalp
Treatment Number (Optional): 7
Detail Level: Zone
Post-Care Instructions: After the procedure, take precautions agains sun exposure. Do not apply sunscreen for 12 hours after the procedure. Do not apply make-up for 12 hours after the procedure. Avoid alcohol based toners for 10-14 days. After 2-3 days patients can return to their regular skin regimen.

## 2021-10-18 NOTE — PROCEDURE: PLATELET RICH PLASMA INJECTION
Amount Injected At This Location In Cc (Will Not Render If 0): 2
Amount Injected At This Location In Cc (Will Not Render If 0): 0
Consent: Written consent obtained, risks reviewed including but not limited to pain, scarring, infection and incomplete improvement.  Patient understands the procedure is cosmetic in nature and will require out of pocket payment.
Depth In Mm (Will Not Render If 0): 1.5
Location #1: frontal hairline
Amount Of Blood Collected (Optional - Include Units): 60cc
Post-Care Instructions: After the procedure, take precautions agains sun exposure. Do not apply sunscreen for 12 hours after the procedure. Do not apply make-up for 12 hours after the procedure. Avoid alcohol based toners for 10-14 days. After 2-3 days patients can return to their regular skin regimen.
Location #3: right temporal
Which Technique?: Default
Location #2: left temporal
Show Additional Techniques: Yes
Price (Use Numbers Only, No Special Characters Or $): 180
Detail Level: Zone
Location #4: vertex scalp
Treatment Number (Optional): 7

## 2021-11-15 ENCOUNTER — APPOINTMENT (RX ONLY)
Dept: URBAN - METROPOLITAN AREA OTHER 9 | Facility: OTHER | Age: 71
Setting detail: DERMATOLOGY
End: 2021-11-15

## 2021-11-15 DIAGNOSIS — L65.9 NONSCARRING HAIR LOSS, UNSPECIFIED: ICD-10-CM

## 2021-11-15 PROCEDURE — ? MICRONEEDLING

## 2021-11-15 PROCEDURE — ? PLATELET RICH PLASMA INJECTION

## 2021-11-15 NOTE — PROCEDURE: PLATELET RICH PLASMA INJECTION
Amount Injected At This Location In Cc (Will Not Render If 0): 2
Amount Injected At This Location In Cc (Will Not Render If 0): 0
Consent: Written consent obtained, risks reviewed including but not limited to pain, scarring, infection and incomplete improvement.  Patient understands the procedure is cosmetic in nature and will require out of pocket payment.
Depth In Mm (Will Not Render If 0): 1.5
Location #1: frontal hairline
Amount Of Blood Collected (Optional - Include Units): 60cc
Post-Care Instructions: After the procedure, take precautions agains sun exposure. Do not apply sunscreen for 12 hours after the procedure. Do not apply make-up for 12 hours after the procedure. Avoid alcohol based toners for 10-14 days. After 2-3 days patients can return to their regular skin regimen.
Location #3: right temporal
Which Technique?: Default
Location #2: left temporal
Show Additional Techniques: Yes
Price (Use Numbers Only, No Special Characters Or $): 400
Detail Level: Zone
Location #4: vertex scalp
Treatment Number (Optional): 8

## 2021-11-15 NOTE — PROCEDURE: MICRONEEDLING
Location #2: left temporal
Depth In Mm (Location #4): 1.5
Consent: Written consent obtained, risks reviewed including but not limited to pain, scarring, infection and incomplete improvement.  Patient understands the procedure is cosmetic in nature and will require out of pocket payment.
Location #1: frontal hairline
Depth In Mm (Location #5): 0.1
Location #3: right temporal
Location #4: vertex scalp
Treatment Number (Optional): 8
Detail Level: Zone
Post-Care Instructions: After the procedure, take precautions agains sun exposure. Do not apply sunscreen for 12 hours after the procedure. Do not apply make-up for 12 hours after the procedure. Avoid alcohol based toners for 10-14 days. After 2-3 days patients can return to their regular skin regimen.

## 2021-11-29 ENCOUNTER — RX ONLY (OUTPATIENT)
Age: 71
Setting detail: RX ONLY
End: 2021-11-29

## 2021-11-29 RX ORDER — EMOLLIENT COMBINATION NO.53
CREAM (GRAM) TOPICAL
Qty: 300 | Refills: 4 | Status: ERX | COMMUNITY
Start: 2021-11-29

## 2021-12-01 ENCOUNTER — RX ONLY (OUTPATIENT)
Age: 71
Setting detail: RX ONLY
End: 2021-12-01

## 2021-12-01 RX ORDER — EMOLLIENT COMBINATION NO.53
CREAM (GRAM) TOPICAL
Qty: 300 | Refills: 4 | Status: ERX

## 2021-12-01 RX ORDER — EMOLLIENT COMBINATION NO.53
CREAM (GRAM) TOPICAL
Qty: 300 | Refills: 4 | Status: ACTIVE

## 2021-12-01 RX ORDER — EMOLLIENT COMBINATION NO.53
CREAM (GRAM) TOPICAL
Qty: 405 | Refills: 4 | Status: ERX | COMMUNITY
Start: 2021-12-01

## 2021-12-08 ENCOUNTER — RX ONLY (OUTPATIENT)
Age: 71
Setting detail: RX ONLY
End: 2021-12-08

## 2021-12-08 RX ORDER — EMOLLIENT COMBINATION NO.53
CREAM (GRAM) TOPICAL
Qty: 450 | Refills: 1 | Status: ERX | COMMUNITY
Start: 2021-12-08

## 2021-12-15 ENCOUNTER — RX ONLY (OUTPATIENT)
Age: 71
Setting detail: RX ONLY
End: 2021-12-15

## 2021-12-15 RX ORDER — MINOXIDIL 10 MG/1
TABLET ORAL
Qty: 90 | Refills: 0 | Status: ERX

## 2021-12-27 ENCOUNTER — APPOINTMENT (RX ONLY)
Dept: URBAN - METROPOLITAN AREA CLINIC 12 | Facility: CLINIC | Age: 71
Setting detail: DERMATOLOGY
End: 2021-12-27

## 2021-12-27 DIAGNOSIS — Z41.9 ENCOUNTER FOR PROCEDURE FOR PURPOSES OTHER THAN REMEDYING HEALTH STATE, UNSPECIFIED: ICD-10-CM

## 2021-12-27 PROCEDURE — ? BELOTERO INJECTION

## 2021-12-27 PROCEDURE — ? BOTOX

## 2021-12-27 NOTE — PROCEDURE: BELOTERO INJECTION
Additional Area 5 Location: necklace lines
Lateral Face Filler Volume In Cc: 0
Consent: Written consent obtained. Risks include but not limited to bruising, beading, irregular texture, ulceration, infection, allergic reaction, scar formation, incomplete augmentation, temporary nature, procedural pain.
Filler: Belotero
Use Map Statement For Sites (Optional): No
Detail Level: Detailed
Expiration Date (Month Year): 05/2022
Additional Area 1 Location: smile lines
Additional Area 3 Location: glabellar static rhytids
Post-Care Instructions: Patient instructed to apply ice to reduce swelling.
Additional Area 4 Location: chin
Topical Anesthesia?: 23% lidocaine, 7% tetracaine
Lot #: [id number]
Tear Troughs Filler Volume In Cc: 0.6
Additional Area 2 Location: oral commissures
Map Statement: See Attach Map for Complete Details
Price (Use Numbers Only, No Special Characters Or $): 968
Procedural Text: The filler was administered to the treatment areas noted above.
Number Of Syringes (Required For Inventory): 1
Additional Area 4 Volume In Cc: 0.1

## 2021-12-27 NOTE — PROCEDURE: BOTOX
Platsyma Units: 25
Additional Area 3 Location: right upper lip
Use Map Statement For Sites (Optional): Yes
Postcare Instructions: Patient instructed to not lie down for 4 hours and limit physical activity for 24 hours. Patient instructed not to travel by airplane for 48 hours.
Additional Area 1 Units: 0
Bill Summary Price Listed Below, Or Bill Total Of Units X Price Per Unit?: Bill #Units x Price Per Unit
Lot #: [id number]
Map Statment: See attached map for complete details
Dilution (U/0.1 Cc): 5
Additional Area 2 Units: 2.5
Additional Area 1 Location: nasalis depressor
Detail Level: Simple
Price Per Unit In $ (Use Numbers Only, No Text Please.): 17
Expiration Date (Month Year): 03/2024
Additional Area 2 Location: chin
Administered By (Optional): Theresa Vasquez
Consent: Written consent was obtained prior to the procedure. Risks, benefits, expectations and alternatives were discussed including, but not limited to, infection, bleeding, lid/brow ptosis, bruising, swelling, diplopia, temporary effects, incomplete chemical denervation and dissatisfaction with the cosmetic outcome. No guarantee or warranty was given or implied regarding longevity of results.

## 2022-02-02 ENCOUNTER — APPOINTMENT (RX ONLY)
Dept: URBAN - METROPOLITAN AREA OTHER 9 | Facility: OTHER | Age: 72
Setting detail: DERMATOLOGY
End: 2022-02-02

## 2022-02-02 DIAGNOSIS — Z41.9 ENCOUNTER FOR PROCEDURE FOR PURPOSES OTHER THAN REMEDYING HEALTH STATE, UNSPECIFIED: ICD-10-CM

## 2022-02-02 DIAGNOSIS — L65.9 NONSCARRING HAIR LOSS, UNSPECIFIED: ICD-10-CM

## 2022-02-02 PROCEDURE — ? COUNSELING

## 2022-02-02 PROCEDURE — ? PATIENT SPECIFIC COUNSELING

## 2022-02-02 ASSESSMENT — LOCATION SIMPLE DESCRIPTION DERM: LOCATION SIMPLE: RIGHT SCALP

## 2022-02-02 ASSESSMENT — LOCATION ZONE DERM: LOCATION ZONE: SCALP

## 2022-02-02 ASSESSMENT — LOCATION DETAILED DESCRIPTION DERM: LOCATION DETAILED: RIGHT MEDIAL FRONTAL SCALP

## 2022-02-02 NOTE — HPI: FACE (AGING FACE)
How Severe Is It?: moderate
Is This A New Presentation, Or A Follow-Up?: Aging Face
Additional History: Evaluate face for Vampire facial. Last PRP facial was August 2021.

## 2022-02-02 NOTE — PROCEDURE: PATIENT SPECIFIC COUNSELING
Noted excess skin on right side of the neck, explained Dr. Nieto would be the one to correct\\n(liposuction, Morpheus, etc.). Patient got injected by Theresa Berkowitz 12/28/21. A conversation will be had with Theresa about what she previously injected. To soon after injections to do Vampire facial soon. Decided vampire facial will be performed March 18th and then patient will go to be injected by Theresa in beginning of April. Face, neck and chest will be treated on March 18th.
Detail Level: Simple
Recommended patient get a PRP for scalp ASAP. Will be on Tuesday 2/15 at 3pm. Patient reports recent hair shedding.

## 2022-02-15 ENCOUNTER — APPOINTMENT (RX ONLY)
Dept: URBAN - METROPOLITAN AREA OTHER 9 | Facility: OTHER | Age: 72
Setting detail: DERMATOLOGY
End: 2022-02-15

## 2022-02-15 DIAGNOSIS — L65.9 NONSCARRING HAIR LOSS, UNSPECIFIED: ICD-10-CM

## 2022-02-15 PROCEDURE — ? PLATELET RICH PLASMA INJECTION

## 2022-02-15 PROCEDURE — ? MICRONEEDLING

## 2022-02-15 NOTE — PROCEDURE: MICRONEEDLING
Location #2: left temporal
Depth In Mm (Location #4): 1.5
Consent: Written consent obtained, risks reviewed including but not limited to pain, scarring, infection and incomplete improvement.  Patient understands the procedure is cosmetic in nature and will require out of pocket payment.
Location #1: frontal hairline
Depth In Mm (Location #5): 0.1
Location #3: right temporal
Location #4: vertex scalp
Treatment Number (Optional): 9
Detail Level: Zone
Post-Care Instructions: After the procedure, take precautions agains sun exposure. Do not apply sunscreen for 12 hours after the procedure. Do not apply make-up for 12 hours after the procedure. Avoid alcohol based toners for 10-14 days. After 2-3 days patients can return to their regular skin regimen.

## 2022-02-15 NOTE — PROCEDURE: PLATELET RICH PLASMA INJECTION
Amount Injected At This Location In Cc (Will Not Render If 0): 2
Amount Injected At This Location In Cc (Will Not Render If 0): 0
Consent: Written consent obtained, risks reviewed including but not limited to pain, scarring, infection and incomplete improvement.  Patient understands the procedure is cosmetic in nature and will require out of pocket payment.
Depth In Mm (Will Not Render If 0): 1.5
Location #1: frontal hairline
Amount Of Blood Collected (Optional - Include Units): 30cc
Post-Care Instructions: After the procedure, take precautions agains sun exposure. Do not apply sunscreen for 12 hours after the procedure. Do not apply make-up for 12 hours after the procedure. Avoid alcohol based toners for 10-14 days. After 2-3 days patients can return to their regular skin regimen.
Location #3: right temporal
Which Technique?: Default
Location #2: left temporal
Show Additional Techniques: Yes
Price (Use Numbers Only, No Special Characters Or $): 545
Detail Level: Zone
Location #4: vertex scalp
Treatment Number (Optional): 9
Venipuncture Paragraph: An alcohol pad was applied to the venipuncture site. Venipuncture was performed using a butterfly needle. Pressure and a bandaid was applied to the site. No complications were noted.
External Cooling Fan Speed: 5

## 2022-04-01 ENCOUNTER — APPOINTMENT (RX ONLY)
Dept: URBAN - METROPOLITAN AREA OTHER 9 | Facility: OTHER | Age: 72
Setting detail: DERMATOLOGY
End: 2022-04-01

## 2022-04-01 DIAGNOSIS — Z41.9 ENCOUNTER FOR PROCEDURE FOR PURPOSES OTHER THAN REMEDYING HEALTH STATE, UNSPECIFIED: ICD-10-CM

## 2022-04-01 PROCEDURE — ? MICRONEEDLING

## 2022-04-01 PROCEDURE — ? PLATELET RICH PLASMA INJECTION

## 2022-04-01 NOTE — PROCEDURE: PLATELET RICH PLASMA INJECTION
Location #2: left cheek
Amount Injected At This Location In Cc (Will Not Render If 0): 0
Consent: Written consent obtained, risks reviewed including but not limited to pain, scarring, infection and incomplete improvement.  Patient understands the procedure is cosmetic in nature and will require out of pocket payment.
Treatment Number (Optional): 6
Amount Injected At This Location In Cc (Will Not Render If 0): 2
Location #1: right cheek
Amount Of Blood Collected (Optional - Include Units): 30cc
Post-Care Instructions: After the procedure, take precautions agains sun exposure. Do not apply sunscreen for 12 hours after the procedure. Do not apply make-up for 12 hours after the procedure. Avoid alcohol based toners for 10-14 days. After 2-3 days patients can return to their regular skin regimen.
Amount Injected At This Location In Cc (Will Not Render If 0): 1
Location #5: chest
Show Additional Techniques: Yes
Location #4: neck
Location #3: marionettes lines
Price (Use Numbers Only, No Special Characters Or $): 2000
Which Technique?: Default
Detail Level: Zone
Venipuncture Paragraph: An alcohol pad was applied to the venipuncture site. Venipuncture was performed using a butterfly needle. Pressure and a bandaid was applied to the site. No complications were noted.
External Cooling Fan Speed: 5

## 2022-04-01 NOTE — PROCEDURE: MICRONEEDLING
Depth In Mm (Location #2): 0.5
Depth In Mm (Location #5): 0.7
Location #3: left cheek
Location #4: neck
Location #1: forehead
Consent: Written consent obtained, risks reviewed including but not limited to pain, scarring, infection and incomplete improvement.  Patient understands the procedure is cosmetic in nature and will require out of pocket payment.
Detail Level: Zone
Location #2: right cheek
Post-Care Instructions: After the procedure, take precautions agains sun exposure. Do not apply sunscreen for 12 hours after the procedure. Do not apply make-up for 12 hours after the procedure. Avoid alcohol based toners for 10-14 days. After 2-3 days patients can return to their regular skin regimen.
Location #5: chest
Treatment Number (Optional): 6

## 2022-05-12 ENCOUNTER — APPOINTMENT (RX ONLY)
Dept: URBAN - METROPOLITAN AREA CLINIC 12 | Facility: CLINIC | Age: 72
Setting detail: DERMATOLOGY
End: 2022-05-12

## 2022-05-12 DIAGNOSIS — Z41.9 ENCOUNTER FOR PROCEDURE FOR PURPOSES OTHER THAN REMEDYING HEALTH STATE, UNSPECIFIED: ICD-10-CM

## 2022-05-12 PROCEDURE — ? BELOTERO INJECTION

## 2022-05-12 PROCEDURE — ? TEOSYAL RHA 3 INJECTION

## 2022-05-12 NOTE — PROCEDURE: BELOTERO INJECTION
Additional Area 1 Location: perioral lines
Additional Area 3 Location: glabellar static rhytids
Vermilion Lips Filler Volume In Cc: 0
Additional Area 5 Location: necklace lines
Filler: Belotero
Procedural Text: The filler was administered to the treatment areas noted above.
Expiration Date (Month Year): 11/2022
Map Statement: See Attach Map for Complete Details
Price (Use Numbers Only, No Special Characters Or $): 90
Number Of Syringes (Required For Inventory): 1
Additional Area 2 Location: oral commissures
Additional Area 4 Location: left lateral orbital rim
Detail Level: Detailed
Include Cannula Information In Note?: No
Additional Area 4 Volume In Cc: 0.1
Post-Care Instructions: Patient instructed to apply ice to reduce swelling.
Lot #: [id number]
Consent: Written consent obtained. Risks include but not limited to bruising, beading, irregular texture, ulceration, infection, allergic reaction, scar formation, incomplete augmentation, temporary nature, procedural pain.

## 2022-05-12 NOTE — PROCEDURE: TEOSYAL RHA 3 INJECTION
Anesthesia Volume In Cc: 0
Cheeks Filler Volume In Cc: 0.7
Lot #: [id number]
Price (Use Numbers Only, No Special Characters Or $): 484
Procedural Text: The filler was administered to the treatment areas noted above.
Filler: Teosyal RHA 3
Jawline Filler Volume In Cc: 0.3
Map Statment: See Attach Map for Complete Details
Detail Level: Detailed
Consent: Written consent obtained. Risks include but not limited to bruising, beading, irregular texture, ulceration, infection, allergic reaction, scar formation, incomplete augmentation, temporary nature, procedural pain.
Include Cannula Information In Note?: No
Expiration Date (Month Year): 03/2024
Number Of Syringes (Required For Inventory): 1
Additional Area 1 Location: perimental region
Post-Care Instructions: Patient instructed to apply ice to reduce swelling.

## 2022-06-08 ENCOUNTER — APPOINTMENT (RX ONLY)
Dept: URBAN - METROPOLITAN AREA CLINIC 12 | Facility: CLINIC | Age: 72
Setting detail: DERMATOLOGY
End: 2022-06-08

## 2022-06-08 DIAGNOSIS — Z41.9 ENCOUNTER FOR PROCEDURE FOR PURPOSES OTHER THAN REMEDYING HEALTH STATE, UNSPECIFIED: ICD-10-CM

## 2022-06-08 PROCEDURE — ? BELOTERO INJECTION

## 2022-06-08 PROCEDURE — ? BOTOX

## 2022-06-08 NOTE — PROCEDURE: BELOTERO INJECTION
Lateral Face Filler Volume In Cc: 0
Post-Care Instructions: Patient instructed to apply ice to reduce swelling.
Additional Area 3 Location: glabellar static rhytids
Filler: Belotero
Additional Area 1 Location: perioral lines
Price (Use Numbers Only, No Special Characters Or $): 139
Additional Area 4 Location: lateral orbital rim
Use Map Statement For Sites (Optional): No
Lot #: [id number]
Tear Troughs Filler Volume In Cc: 0.3
Procedural Text: The filler was administered to the treatment areas noted above.
Topical Anesthesia?: 23% lidocaine, 7% tetracaine
Number Of Syringes (Required For Inventory): 1
Additional Area 2 Location: oral commissures
Detail Level: Detailed
Expiration Date (Month Year): 11/2022
Consent: Written consent obtained. Risks include but not limited to bruising, beading, irregular texture, ulceration, infection, allergic reaction, scar formation, incomplete augmentation, temporary nature, procedural pain.
Map Statement: See Attach Map for Complete Details
Additional Area 5 Location: necklace lines

## 2022-06-08 NOTE — PROCEDURE: BOTOX
Map Statment: See attached map for complete details
Postcare Instructions: Patient instructed to not lie down for 4 hours and limit physical activity for 24 hours. Patient instructed not to travel by airplane for 48 hours.
Additional Area 1 Units: 0
Detail Level: Simple
Show Price In Note?: yes
Bill Summary Price Listed Below, Or Bill Total Of Units X Price Per Unit?: Bill #Units x Price Per Unit
Dilution (U/0.1 Cc): 5
Consent: Written consent was obtained prior to the procedure. Risks, benefits, expectations and alternatives were discussed including, but not limited to, infection, bleeding, lid/brow ptosis, bruising, swelling, diplopia, temporary effects, incomplete chemical denervation and dissatisfaction with the cosmetic outcome. No guarantee or warranty was given or implied regarding longevity of results.
Additional Area 1 Location: upper lip
Price Per Unit In $ (Use Numbers Only, No Text Please.): 17
Expiration Date (Month Year): 10/2023
Additional Area 2 Location: chin
Glabellar Complex Units: 10
Lot #: [id number]
Additional Area 3 Location: lateral platysmal bands
Perioral Units: 12.5
Administered By (Optional): Theresa Vasquez

## 2022-06-10 ENCOUNTER — RX ONLY (OUTPATIENT)
Age: 72
Setting detail: RX ONLY
End: 2022-06-10

## 2022-06-10 RX ORDER — MINOXIDIL 10 MG/1
TABLET ORAL
Qty: 90 | Refills: 0 | Status: ERX

## 2022-07-19 ENCOUNTER — APPOINTMENT (RX ONLY)
Dept: URBAN - METROPOLITAN AREA OTHER 9 | Facility: OTHER | Age: 72
Setting detail: DERMATOLOGY
End: 2022-07-19

## 2022-07-19 DIAGNOSIS — L65.9 NONSCARRING HAIR LOSS, UNSPECIFIED: ICD-10-CM

## 2022-07-19 PROCEDURE — ? PLATELET RICH PLASMA INJECTION

## 2022-07-19 PROCEDURE — ? MICRONEEDLING

## 2022-07-19 NOTE — PROCEDURE: PLATELET RICH PLASMA INJECTION
Amount Injected At This Location In Cc (Will Not Render If 0): 2
Amount Injected At This Location In Cc (Will Not Render If 0): 0
Consent: Written consent obtained, risks reviewed including but not limited to pain, scarring, infection and incomplete improvement.  Patient understands the procedure is cosmetic in nature and will require out of pocket payment.
Depth In Mm (Will Not Render If 0): 1.5
Location #1: frontal hairline
Amount Of Blood Collected (Optional - Include Units): 30cc
Amount Injected At This Location In Cc (Will Not Render If 0): 1.2
Location #6: right temple
Post-Care Instructions: After the procedure, take precautions agains sun exposure. Do not apply sunscreen for 12 hours after the procedure. Do not apply make-up for 12 hours after the procedure. Avoid alcohol based toners for 10-14 days. After 2-3 days patients can return to their regular skin regimen.
Location #3: right temporal
Which Technique?: Default
Location #2: left temporal
Show Additional Techniques: Yes
Location #5: left temple
Price (Use Numbers Only, No Special Characters Or $): 556
Detail Level: Zone
Location #4: vertex scalp
Treatment Number (Optional): 10
Venipuncture Paragraph: An alcohol pad was applied to the venipuncture site. Venipuncture was performed using a butterfly needle. Pressure and a bandaid was applied to the site. No complications were noted.
External Cooling Fan Speed: 5

## 2022-07-19 NOTE — PROCEDURE: MICRONEEDLING
Location #2: left temporal
Depth In Mm (Location #4): 1.5
Location #5: left  and right temple
Consent: Written consent obtained, risks reviewed including but not limited to pain, scarring, infection and incomplete improvement.  Patient understands the procedure is cosmetic in nature and will require out of pocket payment.
Location #1: frontal hairline
Location #3: right temporal
Location #4: vertex scalp
Treatment Number (Optional): 10
Detail Level: Zone
Post-Care Instructions: After the procedure, take precautions agains sun exposure. Do not apply sunscreen for 12 hours after the procedure. Do not apply make-up for 12 hours after the procedure. Avoid alcohol based toners for 10-14 days. After 2-3 days patients can return to their regular skin regimen.

## 2022-08-05 ENCOUNTER — APPOINTMENT (RX ONLY)
Dept: URBAN - METROPOLITAN AREA OTHER 9 | Facility: OTHER | Age: 72
Setting detail: DERMATOLOGY
End: 2022-08-05

## 2022-08-05 DIAGNOSIS — L65.9 NONSCARRING HAIR LOSS, UNSPECIFIED: ICD-10-CM

## 2022-08-05 PROCEDURE — ? MICRONEEDLING

## 2022-08-05 PROCEDURE — ? PLATELET RICH PLASMA INJECTION

## 2022-08-05 NOTE — PROCEDURE: PLATELET RICH PLASMA INJECTION
Amount Injected At This Location In Cc (Will Not Render If 0): 2
Amount Injected At This Location In Cc (Will Not Render If 0): 0
Consent: Written consent obtained, risks reviewed including but not limited to pain, scarring, infection and incomplete improvement.  Patient understands the procedure is cosmetic in nature and will require out of pocket payment.
Depth In Mm (Will Not Render If 0): 1.5
Location #1: frontal hairline
Amount Of Blood Collected (Optional - Include Units): 30cc
Amount Injected At This Location In Cc (Will Not Render If 0): 1.2
Location #6: right temple
Post-Care Instructions: After the procedure, take precautions agains sun exposure. Do not apply sunscreen for 12 hours after the procedure. Do not apply make-up for 12 hours after the procedure. Avoid alcohol based toners for 10-14 days. After 2-3 days patients can return to their regular skin regimen.
Location #3: right temporal
Which Technique?: Default
Location #2: left temporal
Show Additional Techniques: Yes
Location #5: left temple
Price (Use Numbers Only, No Special Characters Or $): 981
Detail Level: Zone
Location #4: vertex scalp
Treatment Number (Optional): 11
Venipuncture Paragraph: An alcohol pad was applied to the venipuncture site. Venipuncture was performed using a butterfly needle. Pressure and a bandaid was applied to the site. No complications were noted.
External Cooling Fan Speed: 5

## 2022-08-05 NOTE — PROCEDURE: MICRONEEDLING
Location #2: left temporal
Depth In Mm (Location #4): 1.5
Location #5: left  and right temple
Consent: Written consent obtained, risks reviewed including but not limited to pain, scarring, infection and incomplete improvement.  Patient understands the procedure is cosmetic in nature and will require out of pocket payment.
Location #1: frontal hairline
Location #3: right temporal
Location #4: vertex scalp
Treatment Number (Optional): 11
Detail Level: Zone
Post-Care Instructions: After the procedure, take precautions agains sun exposure. Do not apply sunscreen for 12 hours after the procedure. Do not apply make-up for 12 hours after the procedure. Avoid alcohol based toners for 10-14 days. After 2-3 days patients can return to their regular skin regimen.

## 2022-10-10 ENCOUNTER — APPOINTMENT (RX ONLY)
Dept: URBAN - METROPOLITAN AREA OTHER 9 | Facility: OTHER | Age: 72
Setting detail: DERMATOLOGY
End: 2022-10-10

## 2022-10-10 DIAGNOSIS — L65.9 NONSCARRING HAIR LOSS, UNSPECIFIED: ICD-10-CM

## 2022-10-10 PROCEDURE — ? PLATELET RICH PLASMA INJECTION

## 2022-10-10 PROCEDURE — ? MICRONEEDLING

## 2022-10-10 NOTE — PROCEDURE: MICRONEEDLING
Location #2: left temporal
Depth In Mm (Location #4): 1.5
Location #5: left  and right temple
Consent: Written consent obtained, risks reviewed including but not limited to pain, scarring, infection and incomplete improvement.  Patient understands the procedure is cosmetic in nature and will require out of pocket payment.
Location #1: frontal hairline
Location #3: right temporal
Location #4: left and right parietal scalp
Treatment Number (Optional): 12
Detail Level: Zone
Post-Care Instructions: After the procedure, take precautions agains sun exposure. Do not apply sunscreen for 12 hours after the procedure. Do not apply make-up for 12 hours after the procedure. Avoid alcohol based toners for 10-14 days. After 2-3 days patients can return to their regular skin regimen.

## 2022-10-10 NOTE — PROCEDURE: PLATELET RICH PLASMA INJECTION
Amount Injected At This Location In Cc (Will Not Render If 0): 2
Amount Injected At This Location In Cc (Will Not Render If 0): 0
Consent: Written consent obtained, risks reviewed including but not limited to pain, scarring, infection and incomplete improvement.  Patient understands the procedure is cosmetic in nature and will require out of pocket payment.
Depth In Mm (Will Not Render If 0): 1.5
Location #1: frontal hairline
Location #7: left temple
Amount Of Blood Collected (Optional - Include Units): 30cc
Amount Injected At This Location In Cc (Will Not Render If 0): 1.2
Location #6: right temple
Post-Care Instructions: After the procedure, take precautions agains sun exposure. Do not apply sunscreen for 12 hours after the procedure. Do not apply make-up for 12 hours after the procedure. Avoid alcohol based toners for 10-14 days. After 2-3 days patients can return to their regular skin regimen.
Location #3: right temporal
Which Technique?: Default
Location #2: left temporal
Show Additional Techniques: Yes
Location #5: left parietal scalp
Price (Use Numbers Only, No Special Characters Or $): 842
Detail Level: Zone
Location #4: right parietal scalp
Treatment Number (Optional): 12
Venipuncture Paragraph: An alcohol pad was applied to the venipuncture site. Venipuncture was performed using a butterfly needle. Pressure and a bandaid was applied to the site. No complications were noted.
External Cooling Fan Speed: 5

## 2022-11-07 ENCOUNTER — APPOINTMENT (RX ONLY)
Dept: URBAN - METROPOLITAN AREA OTHER 9 | Facility: OTHER | Age: 72
Setting detail: DERMATOLOGY
End: 2022-11-07

## 2022-11-07 DIAGNOSIS — L65.9 NONSCARRING HAIR LOSS, UNSPECIFIED: ICD-10-CM

## 2022-11-07 PROCEDURE — ? MICRONEEDLING

## 2022-11-07 PROCEDURE — ? PLATELET RICH PLASMA INJECTION

## 2022-11-07 NOTE — PROCEDURE: MICRONEEDLING
Location #2: left temporal
Depth In Mm (Location #4): 1.5
Location #5: left  and right temple
Consent: Written consent obtained, risks reviewed including but not limited to pain, scarring, infection and incomplete improvement.  Patient understands the procedure is cosmetic in nature and will require out of pocket payment.
Location #1: frontal hairline
Location #3: right temporal
Location #4: left and right parietal scalp
Treatment Number (Optional): 13
Detail Level: Zone
Post-Care Instructions: After the procedure, take precautions agains sun exposure. Do not apply sunscreen for 12 hours after the procedure. Do not apply make-up for 12 hours after the procedure. Avoid alcohol based toners for 10-14 days. After 2-3 days patients can return to their regular skin regimen.

## 2022-11-07 NOTE — PROCEDURE: PLATELET RICH PLASMA INJECTION
Amount Injected At This Location In Cc (Will Not Render If 0): 2
Amount Injected At This Location In Cc (Will Not Render If 0): 0
Consent: Written consent obtained, risks reviewed including but not limited to pain, scarring, infection and incomplete improvement.  Patient understands the procedure is cosmetic in nature and will require out of pocket payment.
Depth In Mm (Will Not Render If 0): 1.5
Location #1: frontal hairline
Location #7: left temple
Amount Of Blood Collected (Optional - Include Units): 30cc
Amount Injected At This Location In Cc (Will Not Render If 0): 1.2
Location #6: right temple
Post-Care Instructions: After the procedure, take precautions agains sun exposure. Do not apply sunscreen for 12 hours after the procedure. Do not apply make-up for 12 hours after the procedure. Avoid alcohol based toners for 10-14 days. After 2-3 days patients can return to their regular skin regimen.
Location #3: right temporal
Which Technique?: Default
Location #2: left temporal
Show Additional Techniques: Yes
Location #5: left parietal scalp
Price (Use Numbers Only, No Special Characters Or $): 836
Detail Level: Zone
Location #4: right parietal scalp
Treatment Number (Optional): 13
Venipuncture Paragraph: An alcohol pad was applied to the venipuncture site. Venipuncture was performed using a butterfly needle. Pressure and a bandaid was applied to the site. No complications were noted.
Number Of Tubes Drawn: 1
External Cooling Fan Speed: 5

## 2022-12-12 ENCOUNTER — APPOINTMENT (RX ONLY)
Dept: URBAN - METROPOLITAN AREA OTHER 9 | Facility: OTHER | Age: 72
Setting detail: DERMATOLOGY
End: 2022-12-12

## 2022-12-12 DIAGNOSIS — L65.9 NONSCARRING HAIR LOSS, UNSPECIFIED: ICD-10-CM

## 2022-12-12 PROCEDURE — ? MICRONEEDLING

## 2022-12-12 PROCEDURE — ? PLATELET RICH PLASMA INJECTION

## 2022-12-12 NOTE — PROCEDURE: MICRONEEDLING
Location #2: left temporal
Depth In Mm (Location #4): 1.5
Location #5: left  and right temple
Consent: Written consent obtained, risks reviewed including but not limited to pain, scarring, infection and incomplete improvement.  Patient understands the procedure is cosmetic in nature and will require out of pocket payment.
Location #1: frontal hairline
Location #3: right temporal
Location #4: left and right parietal scalp
Treatment Number (Optional): 14
Detail Level: Zone
Post-Care Instructions: After the procedure, take precautions agains sun exposure. Do not apply sunscreen for 12 hours after the procedure. Do not apply make-up for 12 hours after the procedure. Avoid alcohol based toners for 10-14 days. After 2-3 days patients can return to their regular skin regimen.

## 2022-12-12 NOTE — PROCEDURE: PLATELET RICH PLASMA INJECTION
Amount Injected At This Location In Cc (Will Not Render If 0): 2
Amount Injected At This Location In Cc (Will Not Render If 0): 0
Consent: Written consent obtained, risks reviewed including but not limited to pain, scarring, infection and incomplete improvement.  Patient understands the procedure is cosmetic in nature and will require out of pocket payment.
Depth In Mm (Will Not Render If 0): 1.5
Location #1: frontal hairline
Location #7: left temple
Amount Of Blood Collected (Optional - Include Units): 30cc
Amount Injected At This Location In Cc (Will Not Render If 0): 1.2
Location #6: right temple
Post-Care Instructions: After the procedure, take precautions agains sun exposure. Do not apply sunscreen for 12 hours after the procedure. Do not apply make-up for 12 hours after the procedure. Avoid alcohol based toners for 10-14 days. After 2-3 days patients can return to their regular skin regimen.
Location #3: right temporal
Which Technique?: Default
Location #2: left temporal
Show Additional Techniques: Yes
Location #5: left parietal scalp
Price (Use Numbers Only, No Special Characters Or $): 794
Detail Level: Zone
Location #4: right parietal scalp
Treatment Number (Optional): 14
Venipuncture Paragraph: An alcohol pad was applied to the venipuncture site. Venipuncture was performed using a butterfly needle. Pressure and a bandaid was applied to the site. No complications were noted.
Number Of Tubes Drawn: 1
External Cooling Fan Speed: 5

## 2023-01-09 ENCOUNTER — APPOINTMENT (RX ONLY)
Dept: URBAN - METROPOLITAN AREA OTHER 9 | Facility: OTHER | Age: 73
Setting detail: DERMATOLOGY
End: 2023-01-09

## 2023-01-09 DIAGNOSIS — L65.9 NONSCARRING HAIR LOSS, UNSPECIFIED: ICD-10-CM

## 2023-01-09 PROCEDURE — ? PLATELET RICH PLASMA INJECTION

## 2023-01-09 PROCEDURE — ? MICRONEEDLING

## 2023-01-09 NOTE — PROCEDURE: MICRONEEDLING
Location #2: left temporal
Depth In Mm (Location #4): 1.5
Location #5: left  and right temple
Consent: Written consent obtained, risks reviewed including but not limited to pain, scarring, infection and incomplete improvement.  Patient understands the procedure is cosmetic in nature and will require out of pocket payment.
Location #1: frontal hairline
Location #3: right temporal
Location #4: left and right parietal scalp
Treatment Number (Optional): 15
Detail Level: Zone
Post-Care Instructions: After the procedure, take precautions agains sun exposure. Do not apply sunscreen for 12 hours after the procedure. Do not apply make-up for 12 hours after the procedure. Avoid alcohol based toners for 10-14 days. After 2-3 days patients can return to their regular skin regimen.

## 2023-01-09 NOTE — PROCEDURE: PLATELET RICH PLASMA INJECTION
Amount Injected At This Location In Cc (Will Not Render If 0): 2
Amount Injected At This Location In Cc (Will Not Render If 0): 0
Consent: Written consent obtained, risks reviewed including but not limited to pain, scarring, infection and incomplete improvement.  Patient understands the procedure is cosmetic in nature and will require out of pocket payment.
Depth In Mm (Will Not Render If 0): 1.5
Location #1: frontal hairline
Location #7: left temple
Amount Of Blood Collected (Optional - Include Units): 30cc
Amount Injected At This Location In Cc (Will Not Render If 0): 1.2
Location #6: right temple
Post-Care Instructions: After the procedure, take precautions agains sun exposure. Do not apply sunscreen for 12 hours after the procedure. Do not apply make-up for 12 hours after the procedure. Avoid alcohol based toners for 10-14 days. After 2-3 days patients can return to their regular skin regimen.
Location #3: right temporal
Which Technique?: Default
Location #2: left temporal
Show Additional Techniques: Yes
Location #5: left parietal scalp
Price (Use Numbers Only, No Special Characters Or $): 081
Detail Level: Zone
Location #4: right parietal scalp
Treatment Number (Optional): 15
Venipuncture Paragraph: An alcohol pad was applied to the venipuncture site. Venipuncture was performed using a butterfly needle. Pressure and a bandaid was applied to the site. No complications were noted.
Number Of Tubes Drawn: 1
External Cooling Fan Speed: 5

## 2023-01-16 ENCOUNTER — APPOINTMENT (RX ONLY)
Dept: URBAN - METROPOLITAN AREA OTHER 9 | Facility: OTHER | Age: 73
Setting detail: DERMATOLOGY
End: 2023-01-16

## 2023-01-16 DIAGNOSIS — Z41.9 ENCOUNTER FOR PROCEDURE FOR PURPOSES OTHER THAN REMEDYING HEALTH STATE, UNSPECIFIED: ICD-10-CM

## 2023-01-16 PROCEDURE — ? PLATELET RICH PLASMA INJECTION

## 2023-01-16 PROCEDURE — ? MICRONEEDLING

## 2023-01-16 NOTE — PROCEDURE: PLATELET RICH PLASMA INJECTION
Location #2: left cheek
Amount Injected At This Location In Cc (Will Not Render If 0): 0
Consent: Written consent obtained, risks reviewed including but not limited to pain, scarring, infection and incomplete improvement.  Patient understands the procedure is cosmetic in nature and will require out of pocket payment.
Treatment Number (Optional): 7
Amount Injected At This Location In Cc (Will Not Render If 0): 2
Location #1: right cheek
Amount Of Blood Collected (Optional - Include Units): 30cc
Post-Care Instructions: After the procedure, take precautions agains sun exposure. Do not apply sunscreen for 12 hours after the procedure. Do not apply make-up for 12 hours after the procedure. Avoid alcohol based toners for 10-14 days. After 2-3 days patients can return to their regular skin regimen.
Amount Injected At This Location In Cc (Will Not Render If 0): 1
Location #5: chest
Show Additional Techniques: Yes
Location #4: neck
Location #3: marionettes lines
Price (Use Numbers Only, No Special Characters Or $): 2000
Which Technique?: Default
Detail Level: Zone
Venipuncture Paragraph: An alcohol pad was applied to the venipuncture site. Venipuncture was performed using a butterfly needle. Pressure and a bandaid was applied to the site. No complications were noted.
External Cooling Fan Speed: 5

## 2023-01-16 NOTE — PROCEDURE: MICRONEEDLING
Depth In Mm (Location #2): 0.5
Depth In Mm (Location #5): 0.7
Location #3: left cheek
Location #4: neck
Location #1: forehead
Consent: Written consent obtained, risks reviewed including but not limited to pain, scarring, infection and incomplete improvement.  Patient understands the procedure is cosmetic in nature and will require out of pocket payment.
Detail Level: Zone
Location #2: right cheek
Post-Care Instructions: After the procedure, take precautions agains sun exposure. Do not apply sunscreen for 12 hours after the procedure. Do not apply make-up for 12 hours after the procedure. Avoid alcohol based toners for 10-14 days. After 2-3 days patients can return to their regular skin regimen.
Location #5: chest
Treatment Number (Optional): 7

## 2023-02-08 ENCOUNTER — APPOINTMENT (RX ONLY)
Dept: URBAN - METROPOLITAN AREA CLINIC 12 | Facility: CLINIC | Age: 73
Setting detail: DERMATOLOGY
End: 2023-02-08

## 2023-02-08 DIAGNOSIS — Z41.9 ENCOUNTER FOR PROCEDURE FOR PURPOSES OTHER THAN REMEDYING HEALTH STATE, UNSPECIFIED: ICD-10-CM

## 2023-02-08 PROCEDURE — ? RESTYLANE-L INJECTION

## 2023-02-08 PROCEDURE — ? OTHER (COSMETIC)

## 2023-02-08 PROCEDURE — ? BOTOX

## 2023-02-08 NOTE — PROCEDURE: BOTOX
Consent: Written consent was obtained prior to the procedure. Risks, benefits, expectations and alternatives were discussed including, but not limited to, infection, bleeding, lid/brow ptosis, bruising, swelling, diplopia, temporary effects, incomplete chemical denervation and dissatisfaction with the cosmetic outcome. No guarantee or warranty was given or implied regarding longevity of results.
Dilution (U/0.1 Cc): 5
Additional Area 3 Units: 0
Bill Summary Price Listed Below, Or Bill Total Of Units X Price Per Unit?: Bill #Units x Price Per Unit
Administered By (Optional): Theresa Vasquez
Price Per Unit In $ (Use Numbers Only, No Text Please.): 17
Map Statment: See attached map for complete details
Show Price In Note?: yes
Detail Level: Simple
Postcare Instructions: Patient instructed to not lie down for 4 hours and limit physical activity for 24 hours. Patient instructed not to travel by airplane for 48 hours.
Lot #: [id number]
Reconstitution Date: 02/08/2023
Additional Area 3 Location: lateral platysmal band
Glabellar Complex Units: 7.5
Additional Area 1 Location: left lateral platysma band
Additional Area 2 Location: chin
Expiration Date (Month Year): 03/25

## 2023-02-08 NOTE — PROCEDURE: RESTYLANE-L INJECTION
Cheeks Filler Volume In Cc: 0
Detail Level: Detailed
Consent: Written consent obtained. Risks include but not limited to bruising, beading, irregular texture, ulceration, infection, allergic reaction, scar formation, incomplete augmentation, temporary nature, procedural pain.
Procedural Text: The filler was administered to the treatment areas noted above.
Additional Area 4 Location: perimental region
Lot #: [id number]
Additional Area 4 Volume In Cc: 0.3
Post-Care Instructions: Patient instructed to apply ice to reduce swelling.
Additional Area 2 Volume In Cc: 0.2
Map Statement: See Attach Map for Complete Details
Additional Area 2 Location: chin
Include Cannula Information In Note?: No
Expiration Date (Month Year): 01/25
Price (Use Numbers Only, No Special Characters Or $): 395
Additional Area 5 Location: pyriform aperture
Filler: Restylane -L
Number Of Syringes (Required For Inventory): 1
Additional Area 3 Location: upper and lower lips
Additional Area 1 Location: left facial hallow

## 2023-02-08 NOTE — PROCEDURE: OTHER (COSMETIC)
Price $ (Use Numbers Only, No Text Please.): 200
Other (Free Text): Diluted defyne injection fee per Theresa Vasquez RN
Detail Level: Simple
Sticky Other (Free Text): Restylane Defyne (with 0.5 cc lidocaine)\\nBarcode: [id number]\\nLot: [id number]\\nExp: 03/24\\n0.2 cc to tear troughs, lateral orbital rim, and right nasolabial fold \\n$200 for 0.2 cc defyne\\n\\nBelotero residual\\nLot: [id number]\\nExp: 11/22\\n0.7 cc to jawline and left nasolabial fold\\nNo charge per Theresa Vasquez

## 2023-02-24 ENCOUNTER — APPOINTMENT (RX ONLY)
Dept: URBAN - METROPOLITAN AREA CLINIC 12 | Facility: CLINIC | Age: 73
Setting detail: DERMATOLOGY
End: 2023-02-24

## 2023-02-24 DIAGNOSIS — Z42.8 ENCOUNTER FOR OTHER PLASTIC AND RECONSTRUCTIVE SURGERY FOLLOWING MEDICAL PROCEDURE OR HEALED INJURY: ICD-10-CM

## 2023-02-24 PROCEDURE — ? BOTOX

## 2023-02-24 NOTE — PROCEDURE: BOTOX
Additional Area 3 Location: lateral platysmal band
Periorbital Skin Units: 0
Administered By (Optional): Theresa Vasquez
Reconstitution Date: 02/24/2023
Lot #: [id number]
Show Price In Note?: yes
Postcare Instructions: Patient instructed to not lie down for 4 hours and limit physical activity for 24 hours. Patient instructed not to travel by airplane for 48 hours.
Glabellar Complex Units: 15
Bill Summary Price Listed Below, Or Bill Total Of Units X Price Per Unit?: Bill #Units x Price Per Unit
Price Per Unit In $ (Use Numbers Only, No Text Please.): 14
Additional Area 1 Location: lateral platysma bands
Dilution (U/0.1 Cc): 5
Consent: Written consent was obtained prior to the procedure. Risks, benefits, expectations and alternatives were discussed including, but not limited to, infection, bleeding, lid/brow ptosis, bruising, swelling, diplopia, temporary effects, incomplete chemical denervation and dissatisfaction with the cosmetic outcome. No guarantee or warranty was given or implied regarding longevity of results.
Additional Area 2 Location: chin
Map Statment: See attached map for complete details
Expiration Date (Month Year): 02/25
Detail Level: Simple

## 2023-03-17 ENCOUNTER — APPOINTMENT (RX ONLY)
Dept: URBAN - METROPOLITAN AREA OTHER 4 | Facility: OTHER | Age: 73
Setting detail: DERMATOLOGY
End: 2023-03-17

## 2023-03-17 DIAGNOSIS — L57.8 OTHER SKIN CHANGES DUE TO CHRONIC EXPOSURE TO NONIONIZING RADIATION: ICD-10-CM

## 2023-03-17 DIAGNOSIS — D22 MELANOCYTIC NEVI: ICD-10-CM

## 2023-03-17 DIAGNOSIS — L82.1 OTHER SEBORRHEIC KERATOSIS: ICD-10-CM

## 2023-03-17 DIAGNOSIS — I83.9 ASYMPTOMATIC VARICOSE VEINS OF LOWER EXTREMITIES: ICD-10-CM

## 2023-03-17 PROBLEM — D22.5 MELANOCYTIC NEVI OF TRUNK: Status: ACTIVE | Noted: 2023-03-17

## 2023-03-17 PROBLEM — I83.93 ASYMPTOMATIC VARICOSE VEINS OF BILATERAL LOWER EXTREMITIES: Status: ACTIVE | Noted: 2023-03-17

## 2023-03-17 PROCEDURE — ? DEFER

## 2023-03-17 PROCEDURE — ? BENIGN DESTRUCTION COSMETIC

## 2023-03-17 PROCEDURE — ? COUNSELING

## 2023-03-17 PROCEDURE — ? OBSERVATION

## 2023-03-17 PROCEDURE — 99213 OFFICE O/P EST LOW 20 MIN: CPT

## 2023-03-17 ASSESSMENT — LOCATION DETAILED DESCRIPTION DERM
LOCATION DETAILED: RIGHT INFERIOR MEDIAL UPPER BACK
LOCATION DETAILED: LEFT DISTAL CALF
LOCATION DETAILED: LEFT MID-UPPER BACK
LOCATION DETAILED: LEFT ANTERIOR PROXIMAL THIGH
LOCATION DETAILED: LEFT INFERIOR CENTRAL MALAR CHEEK
LOCATION DETAILED: RIGHT PROXIMAL CALF
LOCATION DETAILED: LEFT MEDIAL UPPER BACK
LOCATION DETAILED: LEFT MEDIAL SUPERIOR CHEST

## 2023-03-17 ASSESSMENT — LOCATION SIMPLE DESCRIPTION DERM
LOCATION SIMPLE: RIGHT CALF
LOCATION SIMPLE: CHEST
LOCATION SIMPLE: RIGHT UPPER BACK
LOCATION SIMPLE: LEFT THIGH
LOCATION SIMPLE: LEFT UPPER BACK
LOCATION SIMPLE: LEFT CALF
LOCATION SIMPLE: LEFT CHEEK

## 2023-03-17 ASSESSMENT — LOCATION ZONE DERM
LOCATION ZONE: LEG
LOCATION ZONE: FACE
LOCATION ZONE: TRUNK

## 2023-03-17 ASSESSMENT — PAIN INTENSITY VAS: HOW INTENSE IS YOUR PAIN 0 BEING NO PAIN, 10 BEING THE MOST SEVERE PAIN POSSIBLE?: NO PAIN

## 2023-03-17 NOTE — PROCEDURE: DEFER
Size Of Lesion In Cm (Optional): 0
Introduction Text (Please End With A Colon): The following procedure was deferred:
Detail Level: Simple
Instructions (Optional): Referred to Dr Velásquez.

## 2023-03-20 ENCOUNTER — APPOINTMENT (RX ONLY)
Dept: URBAN - METROPOLITAN AREA OTHER 9 | Facility: OTHER | Age: 73
Setting detail: DERMATOLOGY
End: 2023-03-20

## 2023-03-20 DIAGNOSIS — L65.9 NONSCARRING HAIR LOSS, UNSPECIFIED: ICD-10-CM

## 2023-03-20 PROCEDURE — ? PLATELET RICH PLASMA INJECTION

## 2023-03-20 PROCEDURE — ? MICRONEEDLING

## 2023-03-20 NOTE — PROCEDURE: PLATELET RICH PLASMA INJECTION
Amount Injected At This Location In Cc (Will Not Render If 0): 2
Amount Injected At This Location In Cc (Will Not Render If 0): 0
Consent: Written consent obtained, risks reviewed including but not limited to pain, scarring, infection and incomplete improvement.  Patient understands the procedure is cosmetic in nature and will require out of pocket payment.
Depth In Mm (Will Not Render If 0): 1.5
Location #1: frontal hairline
Location #7: left temple
Amount Of Blood Collected (Optional - Include Units): 30cc
Amount Injected At This Location In Cc (Will Not Render If 0): 1.2
Location #6: right temple
Post-Care Instructions: After the procedure, take precautions agains sun exposure. Do not apply sunscreen for 12 hours after the procedure. Do not apply make-up for 12 hours after the procedure. Avoid alcohol based toners for 10-14 days. After 2-3 days patients can return to their regular skin regimen.
Location #3: right temporal
Which Technique?: Default
Location #2: left temporal
Show Additional Techniques: Yes
Location #5: left parietal scalp
Price (Use Numbers Only, No Special Characters Or $): 076
Detail Level: Zone
Location #4: right parietal scalp
Treatment Number (Optional): 16
Venipuncture Paragraph: An alcohol pad was applied to the venipuncture site. Venipuncture was performed using a butterfly needle. Pressure and a bandaid was applied to the site. No complications were noted.
Number Of Tubes Drawn: 1
External Cooling Fan Speed: 5

## 2023-03-20 NOTE — PROCEDURE: MICRONEEDLING
Location #2: left temporal
Depth In Mm (Location #4): 1.5
Location #5: left  and right temple
Consent: Written consent obtained, risks reviewed including but not limited to pain, scarring, infection and incomplete improvement.  Patient understands the procedure is cosmetic in nature and will require out of pocket payment.
Location #1: frontal hairline
Location #3: right temporal
Location #4: left and right parietal scalp
Treatment Number (Optional): 16
Detail Level: Zone
Post-Care Instructions: After the procedure, take precautions agains sun exposure. Do not apply sunscreen for 12 hours after the procedure. Do not apply make-up for 12 hours after the procedure. Avoid alcohol based toners for 10-14 days. After 2-3 days patients can return to their regular skin regimen.
Depth In Mm (Location #6): 0.1

## 2023-03-31 ENCOUNTER — RX ONLY (OUTPATIENT)
Age: 73
Setting detail: RX ONLY
End: 2023-03-31

## 2023-03-31 RX ORDER — MINOXIDIL 10 MG/1
TABLET ORAL
Qty: 90 | Refills: 0 | Status: ERX

## 2023-04-04 ENCOUNTER — RX ONLY (OUTPATIENT)
Age: 73
Setting detail: RX ONLY
End: 2023-04-04

## 2023-04-04 RX ORDER — MINOXIDIL 10 MG/1
TABLET ORAL
Qty: 90 | Refills: 2 | Status: ERX

## 2023-04-12 ENCOUNTER — APPOINTMENT (RX ONLY)
Dept: URBAN - METROPOLITAN AREA CLINIC 12 | Facility: CLINIC | Age: 73
Setting detail: DERMATOLOGY
End: 2023-04-12

## 2023-04-12 DIAGNOSIS — Z41.9 ENCOUNTER FOR PROCEDURE FOR PURPOSES OTHER THAN REMEDYING HEALTH STATE, UNSPECIFIED: ICD-10-CM

## 2023-04-12 PROCEDURE — ? RESTYLANE REFYNE INJECTION

## 2023-04-12 NOTE — PROCEDURE: RESTYLANE REFYNE INJECTION
Cheeks Filler Volume In Cc: 0
Price (Use Numbers Only, No Special Characters Or $): 426
Map Statement: See Attach Map for Complete Details
Post-Care Instructions: Patient instructed to apply ice to reduce swelling.
Jawline Filler Volume In Cc: 0.1
Procedural Text: The filler was administered to the treatment areas noted above.
Additional Area 2 Location: static rhytid on left cheek
Lot #: [id number]
Include Cannula Information In Note?: No
Expiration Date (Month Year): 02/2024
Lateral Face Filler Volume In Cc: 0.6
Detail Level: Detailed
Additional Area 2 Volume In Cc: 0.3
Filler: Restylane Refyne
Number Of Syringes (Required For Inventory): 1
Consent: Written consent obtained. Risks include but not limited to bruising, beading, irregular texture, ulceration, infection, allergic reaction, scar formation, incomplete augmentation, temporary nature, procedural pain.
Additional Area 1 Location: chin and perimental region

## 2023-04-24 ENCOUNTER — APPOINTMENT (RX ONLY)
Dept: URBAN - METROPOLITAN AREA OTHER 9 | Facility: OTHER | Age: 73
Setting detail: DERMATOLOGY
End: 2023-04-24

## 2023-04-24 DIAGNOSIS — L65.9 NONSCARRING HAIR LOSS, UNSPECIFIED: ICD-10-CM

## 2023-04-24 PROCEDURE — 99212 OFFICE O/P EST SF 10 MIN: CPT

## 2023-04-24 PROCEDURE — ? COUNSELING

## 2023-04-24 PROCEDURE — ? PATIENT SPECIFIC COUNSELING

## 2023-04-24 ASSESSMENT — LOCATION DETAILED DESCRIPTION DERM: LOCATION DETAILED: RIGHT MEDIAL FRONTAL SCALP

## 2023-04-24 ASSESSMENT — LOCATION SIMPLE DESCRIPTION DERM: LOCATION SIMPLE: RIGHT SCALP

## 2023-04-24 ASSESSMENT — LOCATION ZONE DERM: LOCATION ZONE: SCALP

## 2023-04-24 NOTE — PROCEDURE: PATIENT SPECIFIC COUNSELING
Detail Level: Simple
Recommended patient get a PRP for frontal and temporal scalp ASAP. Patient reports recent hair shedding. Patient states that she is going to call to schedule appointments for PRP next week and end of [place]

## 2023-05-01 ENCOUNTER — APPOINTMENT (RX ONLY)
Dept: URBAN - METROPOLITAN AREA OTHER 9 | Facility: OTHER | Age: 73
Setting detail: DERMATOLOGY
End: 2023-05-01

## 2023-05-01 DIAGNOSIS — L65.9 NONSCARRING HAIR LOSS, UNSPECIFIED: ICD-10-CM

## 2023-05-01 PROCEDURE — ? MICRONEEDLING

## 2023-05-01 PROCEDURE — ? PLATELET RICH PLASMA INJECTION

## 2023-05-01 NOTE — PROCEDURE: MICRONEEDLING
Location #2: left temporal
Depth In Mm (Location #4): 1.5
Location #5: left  and right temple
Consent: Written consent obtained, risks reviewed including but not limited to pain, scarring, infection and incomplete improvement.  Patient understands the procedure is cosmetic in nature and will require out of pocket payment.
Location #1: frontal hairline
Location #3: right temporal
Location #4: left and right parietal scalp
Treatment Number (Optional): 17
Detail Level: Zone
Post-Care Instructions: After the procedure, take precautions agains sun exposure. Do not apply sunscreen for 12 hours after the procedure. Do not apply make-up for 12 hours after the procedure. Avoid alcohol based toners for 10-14 days. After 2-3 days patients can return to their regular skin regimen.
Depth In Mm (Location #6): 0.1

## 2023-05-01 NOTE — PROCEDURE: PLATELET RICH PLASMA INJECTION
Amount Injected At This Location In Cc (Will Not Render If 0): 2
Amount Injected At This Location In Cc (Will Not Render If 0): 0
Consent: Written consent obtained, risks reviewed including but not limited to pain, scarring, infection and incomplete improvement.  Patient understands the procedure is cosmetic in nature and will require out of pocket payment.
Depth In Mm (Will Not Render If 0): 1.5
Location #1: frontal hairline
Amount Of Blood Collected (Optional - Include Units): 30cc
Amount Injected At This Location In Cc (Will Not Render If 0): 1.2
Location #6: vertex scalp
Post-Care Instructions: After the procedure, take precautions agains sun exposure. Do not apply sunscreen for 12 hours after the procedure. Do not apply make-up for 12 hours after the procedure. Avoid alcohol based toners for 10-14 days. After 2-3 days patients can return to their regular skin regimen.
Location #3: right temporal
Which Technique?: Default
Location #2: left temporal
Show Additional Techniques: Yes
Location #5: left parietal scalp
Price (Use Numbers Only, No Special Characters Or $): 115
Detail Level: Zone
Location #4: right parietal scalp
Treatment Number (Optional): 17
Anesthesia Volume In Cc: 9
Venipuncture Paragraph: An alcohol pad was applied to the venipuncture site. Venipuncture was performed using a butterfly needle. Pressure and a bandaid was applied to the site. No complications were noted.
Number Of Tubes Drawn: 1
External Cooling Fan Speed: 5
